# Patient Record
Sex: MALE | Race: WHITE | NOT HISPANIC OR LATINO | Employment: FULL TIME | URBAN - METROPOLITAN AREA
[De-identification: names, ages, dates, MRNs, and addresses within clinical notes are randomized per-mention and may not be internally consistent; named-entity substitution may affect disease eponyms.]

---

## 2017-11-24 ENCOUNTER — OFFICE VISIT (OUTPATIENT)
Dept: FAMILY MEDICINE | Facility: CLINIC | Age: 42
End: 2017-11-24
Payer: COMMERCIAL

## 2017-11-24 VITALS
DIASTOLIC BLOOD PRESSURE: 88 MMHG | HEIGHT: 74 IN | TEMPERATURE: 97 F | SYSTOLIC BLOOD PRESSURE: 132 MMHG | HEART RATE: 90 BPM | BODY MASS INDEX: 33.11 KG/M2 | WEIGHT: 258 LBS

## 2017-11-24 DIAGNOSIS — Z00.00 ROUTINE HISTORY AND PHYSICAL EXAMINATION OF ADULT: Primary | ICD-10-CM

## 2017-11-24 LAB
ALBUMIN SERPL-MCNC: 3.9 G/DL (ref 3.4–5)
ALP SERPL-CCNC: 57 U/L (ref 40–150)
ALT SERPL W P-5'-P-CCNC: 55 U/L (ref 0–70)
ANION GAP SERPL CALCULATED.3IONS-SCNC: 6 MMOL/L (ref 3–14)
AST SERPL W P-5'-P-CCNC: 36 U/L (ref 0–45)
BILIRUB SERPL-MCNC: 0.5 MG/DL (ref 0.2–1.3)
BUN SERPL-MCNC: 12 MG/DL (ref 7–30)
CALCIUM SERPL-MCNC: 8.6 MG/DL (ref 8.5–10.1)
CHLORIDE SERPL-SCNC: 104 MMOL/L (ref 94–109)
CHOLEST SERPL-MCNC: 143 MG/DL
CO2 SERPL-SCNC: 29 MMOL/L (ref 20–32)
CREAT SERPL-MCNC: 0.96 MG/DL (ref 0.66–1.25)
GFR SERPL CREATININE-BSD FRML MDRD: 86 ML/MIN/1.7M2
GLUCOSE SERPL-MCNC: 103 MG/DL (ref 70–99)
HDLC SERPL-MCNC: 58 MG/DL
LDLC SERPL CALC-MCNC: 63 MG/DL
NONHDLC SERPL-MCNC: 85 MG/DL
POTASSIUM SERPL-SCNC: 3.8 MMOL/L (ref 3.4–5.3)
PROT SERPL-MCNC: 7.3 G/DL (ref 6.8–8.8)
SODIUM SERPL-SCNC: 139 MMOL/L (ref 133–144)
TRIGL SERPL-MCNC: 111 MG/DL

## 2017-11-24 PROCEDURE — 80053 COMPREHEN METABOLIC PANEL: CPT | Performed by: PHYSICIAN ASSISTANT

## 2017-11-24 PROCEDURE — 99396 PREV VISIT EST AGE 40-64: CPT | Performed by: PHYSICIAN ASSISTANT

## 2017-11-24 PROCEDURE — 36415 COLL VENOUS BLD VENIPUNCTURE: CPT | Performed by: PHYSICIAN ASSISTANT

## 2017-11-24 PROCEDURE — 80061 LIPID PANEL: CPT | Performed by: PHYSICIAN ASSISTANT

## 2017-11-24 NOTE — NURSING NOTE
"Chief Complaint   Patient presents with     Physical       Initial BP (!) 132/98 (BP Location: Right arm, Patient Position: Chair, Cuff Size: Adult Large)  Pulse 90  Temp 97  F (36.1  C) (Tympanic)  Ht 6' 2\" (1.88 m)  Wt 258 lb (117 kg)  BMI 33.13 kg/m2 Estimated body mass index is 33.13 kg/(m^2) as calculated from the following:    Height as of this encounter: 6' 2\" (1.88 m).    Weight as of this encounter: 258 lb (117 kg).  Medication Reconciliation: complete    Health Maintenance that is potentially due pending provider review:  NONE    n/a    Is there anyone who you would like to be able to receive your results? No  If yes have patient fill out UNIQUE    Ellen SALAS CMA    "

## 2017-11-24 NOTE — LETTER
November 27, 2017      Chong Gomez  [id number] [facility] [address]        Dear ,    We are writing to inform you of your test results.    Labs are excellent!     Resulted Orders   Lipid panel reflex to direct LDL Fasting   Result Value Ref Range    Cholesterol 143 <200 mg/dL    Triglycerides 111 <150 mg/dL    HDL Cholesterol 58 >39 mg/dL    LDL Cholesterol Calculated 63 <100 mg/dL      Comment:      Desirable:       <100 mg/dl    Non HDL Cholesterol 85 <130 mg/dL   Comprehensive metabolic panel (BMP + Alb, Alk Phos, ALT, AST, Total. Bili, TP)   Result Value Ref Range    Sodium 139 133 - 144 mmol/L    Potassium 3.8 3.4 - 5.3 mmol/L    Chloride 104 94 - 109 mmol/L    Carbon Dioxide 29 20 - 32 mmol/L    Anion Gap 6 3 - 14 mmol/L    Glucose 103 (H) 70 - 99 mg/dL    Urea Nitrogen 12 7 - 30 mg/dL    Creatinine 0.96 0.66 - 1.25 mg/dL    GFR Estimate 86 >60 mL/min/1.7m2      Comment:      Non  GFR Calc    GFR Estimate If Black >90 >60 mL/min/1.7m2      Comment:       GFR Calc    Calcium 8.6 8.5 - 10.1 mg/dL    Bilirubin Total 0.5 0.2 - 1.3 mg/dL    Albumin 3.9 3.4 - 5.0 g/dL    Protein Total 7.3 6.8 - 8.8 g/dL    Alkaline Phosphatase 57 40 - 150 U/L    ALT 55 0 - 70 U/L    AST 36 0 - 45 U/L       If you have any questions or concerns, please call the clinic at the number listed above.       Sincerely,    Venkatesh Morelos PA-C/ ss

## 2017-11-24 NOTE — PROGRESS NOTES
SUBJECTIVE:   CC: Chong Gomez is an 41 year old male who presents for preventative health visit.     Healthy Habits:    Do you get at least three servings of calcium containing foods daily (dairy, green leafy vegetables, etc.)? yes    Amount of exercise or daily activities, outside of work: 1-2 day(s) per week    Problems taking medications regularly No    Medication side effects: No    Have you had an eye exam in the past two years? no    Do you see a dentist twice per year? yes    Do you have sleep apnea, excessive snoring or daytime drowsiness?no            Today's PHQ-2 Score:   PHQ-2 ( 1999 [employer]) 12/21/2013 12/16/2013   Q1: Little interest or pleasure in doing things 0 0   Q2: Feeling down, depressed or hopeless 0 0   PHQ-2 Score 0 0         Abuse: Current or Past(Physical, Sexual or Emotional)- No  Do you feel safe in your environment - Yes  Social History   Substance Use Topics     Smoking status: Former Smoker     Packs/day: 0.50     Years: 20.00     Types: Cigarettes     Quit date: 6/1/2013     Smokeless tobacco: Never Used     Alcohol use Yes      Comment: 12 pk a week      The patient does not drink >3 drinks per day nor >7 drinks per week.    Last PSA: No results found for: PSA    Reviewed orders with patient. Reviewed health maintenance and updated orders accordingly - Yes  BP Readings from Last 3 Encounters:   11/24/17 132/88   01/27/14 138/84   01/27/14 138/84    Wt Readings from Last 3 Encounters:   11/24/17 258 lb (117 kg)   01/27/14 243 lb (110.2 kg)   01/27/14 243 lb (110.2 kg)                  Patient Active Problem List   Diagnosis     CARDIOVASCULAR SCREENING; LDL GOAL LESS THAN 160     Chronic low back pain     Past Surgical History:   Procedure Laterality Date     SURGICAL HISTORY OF -   child     strabismus      SURGICAL HISTORY OF -   age 13     right wrist fracture        Social History   Substance Use Topics     Smoking status: Former Smoker     Packs/day: 0.50     Years: 20.00      "Types: Cigarettes     Quit date: 6/1/2013     Smokeless tobacco: Never Used     Alcohol use Yes      Comment: 12 pk a week      History reviewed. No pertinent family history.      No current outpatient prescriptions on file.     No Known Allergies        Reviewed and updated as needed this visit by clinical staffTobacco  Allergies  Meds  Med Hx  Surg Hx  Fam Hx  Soc Hx        Reviewed and updated as needed this visit by Provider        Past Medical History:   Diagnosis Date     NO ACTIVE PROBLEMS       Past Surgical History:   Procedure Laterality Date     SURGICAL HISTORY OF -   child     strabismus      SURGICAL HISTORY OF -   age 13     right wrist fracture        ROS:  C: NEGATIVE for fever, chills, change in weight  I: NEGATIVE for worrisome rashes, moles or lesions  E: NEGATIVE for vision changes or irritation  ENT: NEGATIVE for ear, mouth and throat problems  R: NEGATIVE for significant cough or SOB  CV: NEGATIVE for chest pain, palpitations or peripheral edema  GI: NEGATIVE for nausea, abdominal pain, heartburn, or change in bowel habits   male: negative for dysuria, hematuria, decreased urinary stream, erectile dysfunction, urethral discharge  M: NEGATIVE for significant arthralgias or myalgia  N: NEGATIVE for weakness, dizziness or paresthesias  P: NEGATIVE for changes in mood or affect    OBJECTIVE:   BP (!) 146/97 (BP Location: Right arm, Patient Position: Chair, Cuff Size: Adult Large)  Pulse 90  Temp 97  F (36.1  C) (Tympanic)  Ht 6' 2\" (1.88 m)  Wt 258 lb (117 kg)  BMI 33.13 kg/m2  EXAM:  GENERAL: healthy, alert and no distress  EYES: Eyes grossly normal to inspection, PERRL and conjunctivae and sclerae normal  NECK: no adenopathy, no asymmetry, masses, or scars and thyroid normal to palpation  RESP: lungs clear to auscultation - no rales, rhonchi or wheezes  CV: regular rate and rhythm, normal S1 S2, no S3 or S4, no murmur, click or rub, no peripheral edema and peripheral pulses " "strong  ABDOMEN: soft, nontender, no hepatosplenomegaly, no masses and bowel sounds normal  MS: no gross musculoskeletal defects noted, no edema  SKIN: no suspicious lesions or rashes  NEURO: Normal strength and tone, mentation intact and speech normal  PSYCH: mentation appears normal, affect normal/bright    ASSESSMENT/PLAN:   (Z00.00) Routine history and physical examination of adult  (primary encounter diagnosis)  Comment:   Plan: Lipid panel reflex to direct LDL Fasting,         Comprehensive metabolic panel (BMP + Alb, Alk         Phos, ALT, AST, Total. Bili, TP)            COUNSELING:  Reviewed preventive health counseling, as reflected in patient instructions       Regular exercise       Healthy diet/nutrition    BP Screening:   Last 3 BP Readings:    BP Readings from Last 3 Encounters:   11/24/17 132/88   01/27/14 138/84   01/27/14 138/84       The following was recommended to the patient:  Recommend lifestyle modifications       reports that he quit smoking about 4 years ago. His smoking use included Cigarettes. He has a 10.00 pack-year smoking history. He has never used smokeless tobacco.      Estimated body mass index is 33.13 kg/(m^2) as calculated from the following:    Height as of this encounter: 6' 2\" (1.88 m).    Weight as of this encounter: 258 lb (117 kg).   Weight management plan: Discussed healthy diet and exercise guidelines and patient will follow up in 12 months in clinic to re-evaluate.    Counseling Resources:  ATP IV Guidelines  Pooled Cohorts Equation Calculator  FRAX Risk Assessment  ICSI Preventive Guidelines  Dietary Guidelines for Americans, 2010  USDA's MyPlate  ASA Prophylaxis  Lung CA Screening    Mahamed AUGUSTS  [facility]      Venkatesh Morelos PA-C  [facility]  "

## 2017-11-24 NOTE — MR AVS SNAPSHOT
After Visit Summary   11/24/2017    Chong Gomez    MRN: [id number]           Patient Information     Date Of Birth          1975        Visit Information        Provider Department      11/24/2017 8:20 AM Venkatesh Morelos PA-C [facility]        Today's Diagnoses     Routine history and physical examination of adult    -  1      Care Instructions      Preventive Health Recommendations  Male Ages 40 to 49    Yearly exam:             See your health care provider every year in order to  o   Review health changes.   o   Discuss preventive care.    o   Review your medicines if your doctor has prescribed any.    You should be tested each year for STDs (sexually transmitted diseases) if you re at risk.     Have a cholesterol test every 5 years.     Have a colonoscopy (test for colon cancer) if someone in your family has had colon cancer or polyps before age 50.     After age 45, have a diabetes test (fasting glucose). If you are at risk for diabetes, you should have this test every 3 years.      Talk with your health care provider about whether or not a prostate cancer screening test (PSA) is right for you.    Shots: Get a flu shot each year. Get a tetanus shot every 10 years.     Nutrition:    Eat at least 5 servings of fruits and vegetables daily.     Eat whole-grain bread, whole-wheat pasta and brown rice instead of white grains and rice.     Talk to your provider about Calcium and Vitamin D.     Lifestyle    Exercise for at least 150 minutes a week (30 minutes a day, 5 days a week). This will help you control your weight and prevent disease.     Limit alcohol to one drink per day.     No smoking.     Wear sunscreen to prevent skin cancer.     See your dentist every six months for an exam and cleaning.              Follow-ups after your visit        Follow-up notes from your care team     Return if symptoms worsen or fail to improve.      Who to contact     If you have questions  "or need follow up information about today's clinic visit or your schedule please contact [facility] directly at [phone].  Normal or non-critical lab and imaging results will be communicated to you by Giveohart, letter or phone within 4 business days after the clinic has received the results. If you do not hear from us within 7 days, please contact the clinic through Giveohart or phone. If you have a critical or abnormal lab result, we will notify you by phone as soon as possible.  Submit refill requests through Devunity or call your pharmacy and they will forward the refill request to us. Please allow 3 business days for your refill to be completed.          Additional Information About Your Visit        GiveoharPerosphere Information     Devunity lets you send messages to your doctor, view your test results, renew your prescriptions, schedule appointments and more. To sign up, go to [email or web address] . Click on \"Log in\" on the left side of the screen, which will take you to the Welcome page. Then click on \"Sign up Now\" on the right side of the page.     You will be asked to enter the access code listed below, as well as some personal information. Please follow the directions to create your username and password.     Your access code is: [email or web address]  Expires: 2018 10:44 AM     Your access code will  in 90 days. If you need help or a new code, please call your [facility] clinic or [phone].        Care EveryWhere ID     This is your Care EveryWhere ID. This could be used by other organizations to access your [facility] medical records  [id number]        Your Vitals Were     Pulse Temperature Height BMI (Body Mass Index)          90 97  F (36.1  C) (Tympanic) 6' 2\" (1.88 m) 33.13 kg/m2         Blood Pressure from Last 3 Encounters:   17 132/88   14 138/84   14 138/84    Weight from Last 3 Encounters:   17 258 lb (117 kg)   14 243 lb (110.2 kg)   14 243 lb " (110.2 kg)              We Performed the Following     Comprehensive metabolic panel (BMP + Alb, Alk Phos, ALT, AST, Total. Bili, TP)     Lipid panel reflex to direct LDL Fasting        Primary Care Provider Office Phone # Fax #    Venkatesh Morelos PA-C [phone] [address] [facility] [address]        Equal Access to Services     [facility] CenterJAVIER : Hadii aad ku hadasho Soomaali, waaxda luqadaha, qaybta kaalmada adeegyada, waxay idiin hayaan adeeg kharash la'aan ah. So [facility] [phone].    ATENCIÓN: Si habla español, tiene a chavis disposición servicios gratuitos de asistencia lingüística. Llame al [phone].    We comply with applicable federal civil rights laws and [place] laws. We do not discriminate on the basis of race, color, national origin, age, disability, sex, sexual orientation, or gender identity.            Thank you!     Thank you for choosing [facility]  for your care. Our goal is always to provide you with excellent care. Hearing back from our patients is one way we can continue to improve our services. Please take a few minutes to complete the written survey that you may receive in the mail after your visit with us. Thank you!             Your Updated Medication List - Protect others around you: Learn how to safely use, store and throw away your medicines at www.disposemymeds.org.      Notice  As of 11/24/2017 10:44 AM    You have not been prescribed any medications.

## 2018-04-13 ENCOUNTER — TELEPHONE (OUTPATIENT)
Dept: FAMILY MEDICINE | Facility: CLINIC | Age: 43
End: 2018-04-13

## 2018-04-13 DIAGNOSIS — Z80.0 FAMILY HISTORY OF PANCREATIC CANCER: Primary | ICD-10-CM

## 2018-04-13 NOTE — TELEPHONE ENCOUNTER
Reason for Call: Request for an order or referral:    Order or referral being requested: Chong is wondering if he could get a referral to Genetic Counseling. He says his dad recently passed from pancreatic cancer so he would like to get genetic testing. Zack Morelos is the last provider he saw. He is aware that Zack is out of the office until 4/18 and this is fine. He is aware that we will call him next week when Zack returns to the office.     Date needed: at your convenience    Has the patient been seen by the PCP for this problem? NO    Additional comments: States his dad was diagnosed and he passed away after a short illness    Phone number Patient can be reached at:  Home number on file [phone] (home)    Best Time:  anytime    Can we leave a detailed message on this number?  YES    Call taken on 4/13/2018 at 2:43 PM by Zyara Martin

## 2018-04-18 NOTE — TELEPHONE ENCOUNTER
He would like to be referred to Dr Ashley Duffy at the [facility] at [facility].  Phone: [phone]  Fax: [phone]  Certified Genetic Counselor

## 2018-04-18 NOTE — TELEPHONE ENCOUNTER
I do not think genetic counseling will change anything.  I would recommend yearly visits and focus on lab work targeting the pancreas    I am not aware of genetic counseling recommendations for pancreatic cancer.  If there are such recommendations, I am fine with such referral

## 2018-05-06 ENCOUNTER — OFFICE VISIT (OUTPATIENT)
Dept: URGENT CARE | Facility: URGENT CARE | Age: 43
End: 2018-05-06
Payer: COMMERCIAL

## 2018-05-06 ENCOUNTER — RADIANT APPOINTMENT (OUTPATIENT)
Dept: GENERAL RADIOLOGY | Facility: CLINIC | Age: 43
End: 2018-05-06
Attending: NURSE PRACTITIONER
Payer: COMMERCIAL

## 2018-05-06 VITALS
RESPIRATION RATE: 18 BRPM | DIASTOLIC BLOOD PRESSURE: 86 MMHG | SYSTOLIC BLOOD PRESSURE: 144 MMHG | TEMPERATURE: 97.6 F | HEART RATE: 90 BPM | BODY MASS INDEX: 33.51 KG/M2 | OXYGEN SATURATION: 95 % | WEIGHT: 261 LBS

## 2018-05-06 DIAGNOSIS — S59.901A INJURY OF RIGHT ELBOW, INITIAL ENCOUNTER: ICD-10-CM

## 2018-05-06 DIAGNOSIS — S56.911A ELBOW STRAIN, RIGHT, INITIAL ENCOUNTER: Primary | ICD-10-CM

## 2018-05-06 PROCEDURE — 73080 X-RAY EXAM OF ELBOW: CPT | Mod: RT

## 2018-05-06 PROCEDURE — 99213 OFFICE O/P EST LOW 20 MIN: CPT | Performed by: NURSE PRACTITIONER

## 2018-05-06 NOTE — PROGRESS NOTES
SUBJECTIVE:   Chong Gomez is a 42 year old male who presents to clinic today for the following health issues:  Chief Complaint   Patient presents with     Musculoskeletal Problem     playing with dog yesterday and may have hyper extended arm- went to playfully grab dog and hurt his arm, pain is mostly in the elbow, some swelling, no bruising                  Problem list and histories reviewed & adjusted, as indicated.  Additional history: as documented    Patient Active Problem List   Diagnosis     CARDIOVASCULAR SCREENING; LDL GOAL LESS THAN 160     Chronic low back pain     Past Surgical History:   Procedure Laterality Date     SURGICAL HISTORY OF -   child     strabismus      SURGICAL HISTORY OF -   age 13     right wrist fracture        Social History   Substance Use Topics     Smoking status: Former Smoker     Packs/day: 0.50     Years: 20.00     Types: Cigarettes     Quit date: 6/1/2013     Smokeless tobacco: Never Used     Alcohol use Yes      Comment: 12 pk a week      History reviewed. No pertinent family history.      No current outpatient prescriptions on file.     No Known Allergies  Labs reviewed in EPIC    Reviewed and updated as needed this visit by clinical staff  Tobacco  Allergies  Meds  Problems  Med Hx  Surg Hx  Fam Hx  Soc Hx        Reviewed and updated as needed this visit by Provider  Allergies  Meds  Problems         ROS:  Constitutional, HEENT, cardiovascular, pulmonary, GI, , musculoskeletal, neuro, skin, endocrine and psych systems are negative, except as otherwise noted.    OBJECTIVE:     /86  Pulse 90  Temp 97.6  F (36.4  C) (Tympanic)  Resp 18  Wt 261 lb (118.4 kg)  SpO2 95%  BMI 33.51 kg/m2  Body mass index is 33.51 kg/(m^2).   GENERAL: healthy, alert and no distress, nontoxic in appearance  EYES: Eyes grossly normal to inspection, PERRL and conjunctivae and sclerae normal  HENT: normocephalic  NECK: supple with full ROM  ABDOMEN: soft, nontender, no  hepatosplenomegaly, no masses and bowel sounds normal  MS: no gross musculoskeletal defects noted, no edema, right arm is tender just below elbow with movement of supination/pronation.    Diagnostic Test Results:ELBOW THREE VIEWS RIGHT  5/6/2018 4:58 PM      HISTORY: ; Injury of right elbow, initial encounter     COMPARISON: None.     FINDINGS: There is no significant degenerative change. No posterior  fat-pad is seen. No convincing anterior sail sign is seen. There is no  acute fracture or dislocation. There are no worrisome bony lesions.         IMPRESSION: No acute fracture or dislocation demonstrated.     KHRIS RADFORD MD  Results for orders placed or performed in visit on 05/06/18 (from the past 24 hour(s))   XR Elbow Right G/E 3 Views    Narrative    ELBOW THREE VIEWS RIGHT  5/6/2018 4:58 PM     HISTORY: ; Injury of right elbow, initial encounter    COMPARISON: None.    FINDINGS: There is no significant degenerative change. No posterior  fat-pad is seen. No convincing anterior sail sign is seen. There is no  acute fracture or dislocation. There are no worrisome bony lesions.      Impression    IMPRESSION: No acute fracture or dislocation demonstrated.    KHRIS RADFORD MD       ASSESSMENT/PLAN:   Follow up as needed with PCP if not getting better this next week and sooner of course if needed.  Problem List Items Addressed This Visit     None      Visit Diagnoses     Elbow strain, right, initial encounter    -  Primary    Injury of right elbow, initial encounter        Relevant Orders    XR Elbow Right G/E 3 Views (Completed)               Patient Instructions   RICE advice    Sling for comfort    Ibuprofen and/or tylenol as needed    Follow up with your primary care provider if symptoms worsen or do not resolve.    Follow-up with your primary care provider next week and as needed.    Indications for emergent return to emergency department discussed with patient, who verbalized good understanding and  agreement.  Patient understands the limitations of today's evaluation.         Muscle Strain in the Extremities  A muscle strain is a stretching and tearing of muscle fibers. This causes pain, especially when you move that muscle. There may also be some swelling and bruising.  Home care    Keep the hurt area raised to reduce pain and swelling. This is especially important during the first 48 hours.    Apply an ice pack over the injured area for 15 to 20 minutes every 3 to 6 hours. You should do this for the first 24 to 48 hours. You can make an ice pack by filling a plastic bag that seals at the top with ice cubes and then wrapping it with a thin towel. Be careful not to injure your skin with the ice treatments. Ice should never be applied directly to skin. Continue the use of ice packs for relief of pain and swelling as needed. After 48 hours, apply heat (warm shower or warm bath) for 15 to 20 minutes several times a day, or alternate ice and heat.    You may use over-the-counter pain medicine to control pain, unless another medicine was prescribed. If you have chronic liver or kidney disease or ever had a stomach ulcer or GI bleeding, talk with your healthcare provider before using these medicines.    For leg strains: If crutches have been recommended, don t put full weight on the hurt leg until you can do so without pain. You can return to sports when you are able to hop and run on the injured leg without pain.  Follow-up care  Follow up with your healthcare provider, or as advised.  When to seek medical advice  Call your healthcare provider right away if any of these occur:    The toes of the injured leg become swollen, cold, blue, numb, or tingly    Pain or swelling increases  Date Last Reviewed: 11/19/2015 2000-2017 [employer] [address] [place] [address], [place] [address]. All rights reserved. This information is not intended as a substitute for professional medical care. Always follow your  healthcare professional's instructions.        Elbow Sprain    A sprain is a tearing of the ligaments that hold a joint together. This may take up to 6 weeks to fully heal, depending on how severe it is. Moderate to severe sprains are treated with a sling or splint. Minor sprains can be treated without any special support.  Home care  The following guidelines will help you care for your injury at home:    Keep your arm elevated to reduce pain and swelling. When sitting or lying down keep your arm above the level of your heart. You can do this by placing your arm on a pillow that rests on your chest or on a pillow at your side. This is most important during the first 2 days (48 hours) after injury.    Put an ice pack on the injured area. Do this for 20 minutes every 1 to 2 hours the first day. You can make an ice pack by wrapping a plastic bag of ice cubes in a thin towel. As the ice melts, be careful that the splint doesn t get wet. Continue using the ice pack 3 to 4 times a day for the next 2 days. Then use the ice pack as needed to ease pain and swelling.    If you were given a plaster or fiberglass splint, leave it on as advised, or until you see your healthcare provider. Keep it dry at all times. Bathe with your splint out of the water. Protect it with a large plastic bag, rubber-banded at the top end. If a fiberglass splint gets wet, you can dry it with a hair dryer. Once the splint is removed, move your elbow through its full range of motion several times a day. This will prevent stiffness.    If you were given a sling only, begin gradual range-of-motion exercises after the first few days, unless told otherwise. This will prevent stiffness in the elbow. Stop wearing the sling once the pain is better.    You may use acetaminophen or ibuprofen to control pain, unless another pain medicine was prescribed. If you have chronic liver or kidney disease, talk with your healthcare provider before using these medicines.  Also talk with your provider if you ve had a stomach ulcer or gastrointestinal bleeding.  Follow-up care  Follow up with your doctor as directed.  Any X-rays you had today don t show any broken bones, breaks, or fractures. Sometimes fractures don t show up on the first X-ray. Bruises and sprains can sometimes hurt as much as a fracture. These injuries can take time to heal completely. If your symptoms don t improve or they get worse, talk with your healthcare provider. You may need a repeat X-ray or other tests.  When to seek medical advice  Call your healthcare provider right away  if any of these occur:    The plaster splint becomes wet or soft    The fiberglass splint remains wet for more than 24 hours    Bad odor from the splint or wound fluid stains the splint    Splint cracks    Tightness or pain in the elbow gets worse    Fingers become swollen, cold, blue, numb, or tingly    You are less able to move the elbow, hand or fingers    Area around splint becomes red, swollen, or irritated    Fever of 101 F (38.3 C) or higher, or as directed by your healthcare provider  Date Last Reviewed: 5/1/2017 2000-2017 [employer]. [address] [place] [address]. All rights reserved. This information is not intended as a substitute for professional medical care. Always follow your healthcare professional's instructions.            ANIL Nava [facility]

## 2018-05-06 NOTE — MR AVS SNAPSHOT
After Visit Summary   5/6/2018    Chong Gomez    MRN: [id number]           Patient Information     Date Of Birth          1975        Visit Information        Provider Department      5/6/2018 4:05 PM Maryuri Lindsay APRN [facility]        Today's Diagnoses     Elbow strain, right, initial encounter    -  1    Injury of right elbow, initial encounter          Care Instructions    RICE advice    Sling for comfort    Ibuprofen and/or tylenol as needed    Follow up with your primary care provider if symptoms worsen or do not resolve.    Follow-up with your primary care provider next week and as needed.    Indications for emergent return to emergency department discussed with patient, who verbalized good understanding and agreement.  Patient understands the limitations of today's evaluation.         Muscle Strain in the Extremities  A muscle strain is a stretching and tearing of muscle fibers. This causes pain, especially when you move that muscle. There may also be some swelling and bruising.  Home care    Keep the hurt area raised to reduce pain and swelling. This is especially important during the first 48 hours.    Apply an ice pack over the injured area for 15 to 20 minutes every 3 to 6 hours. You should do this for the first 24 to 48 hours. You can make an ice pack by filling a plastic bag that seals at the top with ice cubes and then wrapping it with a thin towel. Be careful not to injure your skin with the ice treatments. Ice should never be applied directly to skin. Continue the use of ice packs for relief of pain and swelling as needed. After 48 hours, apply heat (warm shower or warm bath) for 15 to 20 minutes several times a day, or alternate ice and heat.    You may use over-the-counter pain medicine to control pain, unless another medicine was prescribed. If you have chronic liver or kidney disease or ever had a stomach ulcer or GI bleeding,  talk with your healthcare provider before using these medicines.    For leg strains: If crutches have been recommended, don t put full weight on the hurt leg until you can do so without pain. You can return to sports when you are able to hop and run on the injured leg without pain.  Follow-up care  Follow up with your healthcare provider, or as advised.  When to seek medical advice  Call your healthcare provider right away if any of these occur:    The toes of the injured leg become swollen, cold, blue, numb, or tingly    Pain or swelling increases  Date Last Reviewed: 11/19/2015 2000-2017 [employer]. [address] [place] [address]. All rights reserved. This information is not intended as a substitute for professional medical care. Always follow your healthcare professional's instructions.        Elbow Sprain    A sprain is a tearing of the ligaments that hold a joint together. This may take up to 6 weeks to fully heal, depending on how severe it is. Moderate to severe sprains are treated with a sling or splint. Minor sprains can be treated without any special support.  Home care  The following guidelines will help you care for your injury at home:    Keep your arm elevated to reduce pain and swelling. When sitting or lying down keep your arm above the level of your heart. You can do this by placing your arm on a pillow that rests on your chest or on a pillow at your side. This is most important during the first 2 days (48 hours) after injury.    Put an ice pack on the injured area. Do this for 20 minutes every 1 to 2 hours the first day. You can make an ice pack by wrapping a plastic bag of ice cubes in a thin towel. As the ice melts, be careful that the splint doesn t get wet. Continue using the ice pack 3 to 4 times a day for the next 2 days. Then use the ice pack as needed to ease pain and swelling.    If you were given a plaster or fiberglass splint, leave it on as advised, or until  you see your healthcare provider. Keep it dry at all times. Bathe with your splint out of the water. Protect it with a large plastic bag, rubber-banded at the top end. If a fiberglass splint gets wet, you can dry it with a hair dryer. Once the splint is removed, move your elbow through its full range of motion several times a day. This will prevent stiffness.    If you were given a sling only, begin gradual range-of-motion exercises after the first few days, unless told otherwise. This will prevent stiffness in the elbow. Stop wearing the sling once the pain is better.    You may use acetaminophen or ibuprofen to control pain, unless another pain medicine was prescribed. If you have chronic liver or kidney disease, talk with your healthcare provider before using these medicines. Also talk with your provider if you ve had a stomach ulcer or gastrointestinal bleeding.  Follow-up care  Follow up with your doctor as directed.  Any X-rays you had today don t show any broken bones, breaks, or fractures. Sometimes fractures don t show up on the first X-ray. Bruises and sprains can sometimes hurt as much as a fracture. These injuries can take time to heal completely. If your symptoms don t improve or they get worse, talk with your healthcare provider. You may need a repeat X-ray or other tests.  When to seek medical advice  Call your healthcare provider right away  if any of these occur:    The plaster splint becomes wet or soft    The fiberglass splint remains wet for more than 24 hours    Bad odor from the splint or wound fluid stains the splint    Splint cracks    Tightness or pain in the elbow gets worse    Fingers become swollen, cold, blue, numb, or tingly    You are less able to move the elbow, hand or fingers    Area around splint becomes red, swollen, or irritated    Fever of 101 F (38.3 C) or higher, or as directed by your healthcare provider  Date Last Reviewed: 5/1/2017 2000-2017 [employer]  "[address] [place] [address]. All rights reserved. This information is not intended as a substitute for professional medical care. Always follow your healthcare professional's instructions.                Follow-ups after your visit        Follow-up notes from your care team     See patient instructions section of the AVS Return if symptoms worsen or fail to improve, for Follow up with your primary care provider.      Who to contact     If you have questions or need follow up information about today's clinic visit or your schedule please contact [facility] directly at [phone].  Normal or non-critical lab and imaging results will be communicated to you by [employer], letter or phone within 4 business days after the clinic has received the results. If you do not hear from us within 7 days, please contact the clinic through [employer] or phone. If you have a critical or abnormal lab result, we will notify you by phone as soon as possible.  Submit refill requests through [employer] or call your pharmacy and they will forward the refill request to us. Please allow 3 business days for your refill to be completed.          Additional Information About Your Visit        MyChart Information     [employer] lets you send messages to your doctor, view your test results, renew your prescriptions, schedule appointments and more. To sign up, go to [email or web address] . Click on \"Log in\" on the left side of the screen, which will take you to the Welcome page. Then click on \"Sign up Now\" on the right side of the page.     You will be asked to enter the access code listed below, as well as some personal information. Please follow the directions to create your username and password.     Your access code is: [email or web address]  Expires: 2018  5:19 PM     Your access code will  in 90 days. If you need help or a new code, please call your [facility] or [phone].        Care " EveryWhere ID     This is your Care EveryWhere ID. This could be used by other organizations to access your [place] medical records  [id number]        Your Vitals Were     Pulse Temperature Respirations Pulse Oximetry BMI (Body Mass Index)       90 97.6  F (36.4  C) (Tympanic) 18 95% 33.51 kg/m2        Blood Pressure from Last 3 Encounters:   05/06/18 144/86   11/24/17 132/88   01/27/14 138/84    Weight from Last 3 Encounters:   05/06/18 261 lb (118.4 kg)   11/24/17 258 lb (117 kg)   01/27/14 243 lb (110.2 kg)              We Performed the Following     XR Elbow Right G/E 3 Views        Primary Care Provider Office Phone # Fax #    Venkatesh Morelos PA-C [phone] [address] [facility] [address]        Equal Access to Services     DAVID [facility] HospitalJAV[facility] : Hadii aad ku hadasho Soerin, waaxda luqadaha, qaybta kaalmada adezionyada, ina lance . So [facility] [phone].    ATENCIÓN: Si habla español, tiene a chavis disposición servicios gratuitos de asistencia lingüística. Llame al [phone].    We comply with applicable federal civil rights laws and [place] laws. We do not discriminate on the basis of race, color, national origin, age, disability, sex, sexual orientation, or gender identity.            Thank you!     Thank you for choosing [facility]  for your care. Our goal is always to provide you with excellent care. Hearing back from our patients is one way we can continue to improve our services. Please take a few minutes to complete the written survey that you may receive in the mail after your visit with us. Thank you!             Your Updated Medication List - Protect others around you: Learn how to safely use, store and throw away your medicines at www.disposemymeds.org.      Notice  As of 5/6/2018  5:19 PM    You have not been prescribed any medications.

## 2018-05-06 NOTE — PATIENT INSTRUCTIONS
RICE advice    Sling for comfort    Ibuprofen and/or tylenol as needed    Follow up with your primary care provider if symptoms worsen or do not resolve.    Follow-up with your primary care provider next week and as needed.    Indications for emergent return to emergency department discussed with patient, who verbalized good understanding and agreement.  Patient understands the limitations of today's evaluation.         Muscle Strain in the Extremities  A muscle strain is a stretching and tearing of muscle fibers. This causes pain, especially when you move that muscle. There may also be some swelling and bruising.  Home care    Keep the hurt area raised to reduce pain and swelling. This is especially important during the first 48 hours.    Apply an ice pack over the injured area for 15 to 20 minutes every 3 to 6 hours. You should do this for the first 24 to 48 hours. You can make an ice pack by filling a plastic bag that seals at the top with ice cubes and then wrapping it with a thin towel. Be careful not to injure your skin with the ice treatments. Ice should never be applied directly to skin. Continue the use of ice packs for relief of pain and swelling as needed. After 48 hours, apply heat (warm shower or warm bath) for 15 to 20 minutes several times a day, or alternate ice and heat.    You may use over-the-counter pain medicine to control pain, unless another medicine was prescribed. If you have chronic liver or kidney disease or ever had a stomach ulcer or GI bleeding, talk with your healthcare provider before using these medicines.    For leg strains: If crutches have been recommended, don t put full weight on the hurt leg until you can do so without pain. You can return to sports when you are able to hop and run on the injured leg without pain.  Follow-up care  Follow up with your healthcare provider, or as advised.  When to seek medical advice  Call your healthcare provider right away if any of these  occur:    The toes of the injured leg become swollen, cold, blue, numb, or tingly    Pain or swelling increases  Date Last Reviewed: 11/19/2015 2000-2017 [employer]. [address] [place] [address], [place] [address]. All rights reserved. This information is not intended as a substitute for professional medical care. Always follow your healthcare professional's instructions.        Elbow Sprain    A sprain is a tearing of the ligaments that hold a joint together. This may take up to 6 weeks to fully heal, depending on how severe it is. Moderate to severe sprains are treated with a sling or splint. Minor sprains can be treated without any special support.  Home care  The following guidelines will help you care for your injury at home:    Keep your arm elevated to reduce pain and swelling. When sitting or lying down keep your arm above the level of your heart. You can do this by placing your arm on a pillow that rests on your chest or on a pillow at your side. This is most important during the first 2 days (48 hours) after injury.    Put an ice pack on the injured area. Do this for 20 minutes every 1 to 2 hours the first day. You can make an ice pack by wrapping a plastic bag of ice cubes in a thin towel. As the ice melts, be careful that the splint doesn t get wet. Continue using the ice pack 3 to 4 times a day for the next 2 days. Then use the ice pack as needed to ease pain and swelling.    If you were given a plaster or fiberglass splint, leave it on as advised, or until you see your healthcare provider. Keep it dry at all times. Bathe with your splint out of the water. Protect it with a large plastic bag, rubber-banded at the top end. If a fiberglass splint gets wet, you can dry it with a hair dryer. Once the splint is removed, move your elbow through its full range of motion several times a day. This will prevent stiffness.    If you were given a sling only, begin gradual range-of-motion exercises after  the first few days, unless told otherwise. This will prevent stiffness in the elbow. Stop wearing the sling once the pain is better.    You may use acetaminophen or ibuprofen to control pain, unless another pain medicine was prescribed. If you have chronic liver or kidney disease, talk with your healthcare provider before using these medicines. Also talk with your provider if you ve had a stomach ulcer or gastrointestinal bleeding.  Follow-up care  Follow up with your doctor as directed.  Any X-rays you had today don t show any broken bones, breaks, or fractures. Sometimes fractures don t show up on the first X-ray. Bruises and sprains can sometimes hurt as much as a fracture. These injuries can take time to heal completely. If your symptoms don t improve or they get worse, talk with your healthcare provider. You may need a repeat X-ray or other tests.  When to seek medical advice  Call your healthcare provider right away  if any of these occur:    The plaster splint becomes wet or soft    The fiberglass splint remains wet for more than 24 hours    Bad odor from the splint or wound fluid stains the splint    Splint cracks    Tightness or pain in the elbow gets worse    Fingers become swollen, cold, blue, numb, or tingly    You are less able to move the elbow, hand or fingers    Area around splint becomes red, swollen, or irritated    Fever of 101 F (38.3 C) or higher, or as directed by your healthcare provider  Date Last Reviewed: 5/1/2017 2000-2017 [employer]. [address] [place] [address], [place] [address]. All rights reserved. This information is not intended as a substitute for professional medical care. Always follow your healthcare professional's instructions.

## 2018-09-11 DIAGNOSIS — Z31.41 FERTILITY TESTING: Primary | ICD-10-CM

## 2019-05-30 ENCOUNTER — TELEPHONE (OUTPATIENT)
Dept: FAMILY MEDICINE | Facility: CLINIC | Age: 44
End: 2019-05-30

## 2019-05-30 NOTE — TELEPHONE ENCOUNTER
Pt states he partially pulled up thumb nail last night changing a tire. States he cleaned it well and applied neosporin and covered it with a dressing. Offered an appointment but pt declined. Advised to monitor for s/s of infection. Advised to let the nail fall of naturally and not pull or cut it off. Pt's tetanus is up to date. Iveth Uriostegui RN

## 2019-05-30 NOTE — TELEPHONE ENCOUNTER
Smashed thumb last night putting wheel on car and it took off part of the nail off.  It is weeping and he has wrapped it with gauze.  Wondering if he should be seen?    Nellie Toure  [facility]

## 2019-05-31 ENCOUNTER — TELEPHONE (OUTPATIENT)
Dept: FAMILY MEDICINE | Facility: CLINIC | Age: 44
End: 2019-05-31

## 2019-05-31 NOTE — TELEPHONE ENCOUNTER
Reason for call:  Patient reporting a symptom    Symptom or request: smashed thumb 5/29 Wed. Spoke with Nurse 5/30. Pt said that it continues to bleed. Wondering if this is normal. Please Advise.    Duration (how long have symptoms been present):  Wed.    Have you been treated for this before? No        Phone Number patient can be reached at:  Home number on file [phone] (home)    Best Time:  Any Time      Can we leave a detailed message on this number:  YES    Call taken on 5/31/2019 at 2:03 PM by Laura Lopez

## 2019-05-31 NOTE — TELEPHONE ENCOUNTER
Right thumb got smashed on Wednesday, using neosporin and gauze, nail is there but still hanging on the edge, not red or painful, not swollen, the bleeding is better. Patient is wondering if ok if it is bleeding.  Advised to monitor the thumb, if develops worsening symptoms or bleeding worsens or drainage is foul or green, develops fever, then needs to be seen, otherwise continue to treat it with neosporin and gauze. Patient agrees to monitor.    ABELINO Fernandez

## 2019-07-08 DIAGNOSIS — Z31.41 FERTILITY TESTING: Primary | ICD-10-CM

## 2019-07-11 DIAGNOSIS — Z31.41 FERTILITY TESTING: ICD-10-CM

## 2019-07-11 LAB
ABNORMAL SPERM: 99 MORPHOLOGY
ABSTINENCE DAYS: 4 DAYS (ref 2–7)
AGGLUTINATION: NO YES/NO
ANALYSIS TEMP - CENTIGRADE: 24 CENTIGRADE
CELL FRAGMENTS: ABNORMAL %
COLLECTION METHOD: ABNORMAL
COLLECTION SITE: ABNORMAL
CONSENT TO RELEASE TO PARTNER: YES
HEAD DEFECT: 98
IMMATURE SPERM: ABNORMAL %
IMMOTILE: 30 %
LAB RECEIPT TIME: ABNORMAL
LIQUEFIED: YES YES/NO
MIDPIECE DEFECT: 46
NON-PROGRESSIVE MOTILITY: 3 %
NORMAL SPERM: 1 % NORMAL FORMS (ref 4–?)
PROGRESSIVE MOTILITY: 67 % (ref 32–?)
ROUND CELLS: 0.2 MILLION/ML (ref ?–2)
SPECIMEN CONCENTRATION: 113 MILLION/ML (ref 15–?)
SPECIMEN PH: 7.6 PH (ref 7.2–?)
SPECIMEN TYPE: ABNORMAL
SPECIMEN VOL UR: 3.2 ML (ref 1.5–?)
TAIL DEFECT: 12
TIME OF ANALYSIS: ABNORMAL
TOTAL NUMBER: 362 MILLION (ref 39–?)
TOTAL PROGRESSIVE MOTILE: 243 MILLION (ref 15.6–?)
VISCOUS: NO YES/NO
VITALITY: ABNORMAL % (ref 58–?)
WBC SPECIMEN: ABNORMAL %

## 2019-07-11 PROCEDURE — 89322 SEMEN ANAL STRICT CRITERIA: CPT

## 2019-07-12 DIAGNOSIS — N46.9 MALE INFERTILITY: Primary | ICD-10-CM

## 2019-07-12 NOTE — RESULT ENCOUNTER NOTE
Please call the patient with the results.  The number of sperm were good, but the percentage of normal looking sperm was less than normal, so this could be contributing to the couple's infertility.  Please also send a letter with results so he can share with his partner (we do not have his partner's name on file so we cannot align it with her chart unless they give us that information).  Please also order a referral for Dr Mark Cordero at the  for a urology consult to see if he has any recommendations for improving the morphology of the sperm.     Thank you.    Claudette Crane MD

## 2019-08-12 NOTE — TELEPHONE ENCOUNTER
MEDICAL RECORDS REQUEST   [facility]  Urology Clinic  [address] [place] [address]  PHONE: [phone]  Fax: [phone]        FUTURE VISIT INFORMATION                                                   Mark Gomez, : 1975 scheduled for future visit at [facility]    APPOINTMENT INFORMATION:    Date: 10/03/19 7:20AM     Provider:  Mark Cordero MD     Reason for Visit/Diagnosis: Infertility     REFERRAL INFORMATION:    Referring provider:  N/A    Specialty: N/A    Referring providers clinic: [facility]     Clinic contact number:  N/A     RECORDS REQUESTED FOR VISIT                                                     NOTES  STATUS/DETAILS   OFFICE NOTE from referring provider  yes   OFFICE NOTE from other specialist  no   DISCHARGE SUMMARY from hospital  no   DISCHARGE REPORT from the ER  no   OPERATIVE REPORT  no   MEDICATION LIST  no   INFERTILITY     ALBUMIN  no   FSH  no   LAST UROLOGY/OB GYN VISIT NOTE  yes   LH  no   SEMEN ANALYSIS (LAST 2)  yes   SHBG  no   T  no     PRE-VISIT CHECKLIST      Record collection complete Yes - All recs in epic    Appointment appropriately scheduled           (right time/right provider) Yes   MyChart activation If no, please explain: In process    Questionnaire complete If no, please explain: In process      Completed by: Lilia Henson

## 2019-09-24 ENCOUNTER — PRE VISIT (OUTPATIENT)
Dept: UROLOGY | Facility: CLINIC | Age: 44
End: 2019-09-24

## 2019-09-24 NOTE — TELEPHONE ENCOUNTER
Patient coming in for fertility consult with Dr. Cordero. SA in system. Patient chart reviewed, no need for call, all records available and ready for appointment.

## 2019-10-03 ENCOUNTER — OFFICE VISIT (OUTPATIENT)
Dept: UROLOGY | Facility: CLINIC | Age: 44
End: 2019-10-03
Payer: COMMERCIAL

## 2019-10-03 ENCOUNTER — PRE VISIT (OUTPATIENT)
Dept: UROLOGY | Facility: CLINIC | Age: 44
End: 2019-10-03

## 2019-10-03 ENCOUNTER — APPOINTMENT (OUTPATIENT)
Dept: LAB | Facility: CLINIC | Age: 44
End: 2019-10-03
Payer: COMMERCIAL

## 2019-10-03 VITALS
WEIGHT: 250 LBS | HEART RATE: 97 BPM | SYSTOLIC BLOOD PRESSURE: 143 MMHG | HEIGHT: 75 IN | BODY MASS INDEX: 31.08 KG/M2 | DIASTOLIC BLOOD PRESSURE: 93 MMHG

## 2019-10-03 DIAGNOSIS — R86.8 TERATOSPERMIA: ICD-10-CM

## 2019-10-03 DIAGNOSIS — Z31.41 FERTILITY TESTING: Primary | ICD-10-CM

## 2019-10-03 LAB — FSH SERPL-ACNC: 3.2 IU/L (ref 0.7–10.8)

## 2019-10-03 ASSESSMENT — PATIENT HEALTH QUESTIONNAIRE - PHQ9
SUM OF ALL RESPONSES TO PHQ QUESTIONS 1-9: 0
SUM OF ALL RESPONSES TO PHQ QUESTIONS 1-9: 0
10. IF YOU CHECKED OFF ANY PROBLEMS, HOW DIFFICULT HAVE THESE PROBLEMS MADE IT FOR YOU TO DO YOUR WORK, TAKE CARE OF THINGS AT HOME, OR GET ALONG WITH OTHER PEOPLE: NOT DIFFICULT AT ALL

## 2019-10-03 ASSESSMENT — ENCOUNTER SYMPTOMS
SHORTNESS OF BREATH: 0
COUGH DISTURBING SLEEP: 0
WHEEZING: 0
SPUTUM PRODUCTION: 0
SNORES LOUDLY: 1
COUGH: 0
DYSPNEA ON EXERTION: 0
HEMOPTYSIS: 0
POSTURAL DYSPNEA: 0

## 2019-10-03 ASSESSMENT — PAIN SCALES - GENERAL: PAINLEVEL: NO PAIN (0)

## 2019-10-03 ASSESSMENT — MIFFLIN-ST. JEOR: SCORE: 2114.62

## 2019-10-03 NOTE — LETTER
October 10, 2019       TO: Mark Gomez  [address] [facility] [address]       DearMr.Jason,    We are writing to inform you of your test results.        Resulted Orders   Testosterone Free and Total   Result Value Ref Range    Testosterone Total 399 240 - 950 ng/dL      Comment:      This test was developed and its performance characteristics determined by the   [facility],  [facility]. It has   not been cleared or approved by the FDA. The laboratory is regulated under   CLIA as qualified to perform high-complexity testing. This test is used for   clinical purposes. It should not be regarded as investigational or for   research.      Sex Hormone Binding Globulin 46 11 - 80 nmol/L    Free Testosterone Calculated 6.54 4.7 - 24.4 ng/dL   Follicle stimulating hormone   Result Value Ref Range    FSH 3.2 0.7 - 10.8 IU/L   Estradiol ultrasensitive   Result Value Ref Range    Estradiol Ultrasensitive 21 10 - 40 pg/mL      Comment:      Reference Ranges  Prepubertal Males:  0-13 pg/mL  Adult Males:  10-40 pg/mL  This test was developed and its performance characteristics determined by the   [facility],  [facility]. It has   not been cleared or approved by the FDA. The laboratory is regulated under   CLIA as qualified to perform high-complexity testing. This test is used for   clinical purposes. It should not be regarded as investigational or for   research.         Dr Mark Cordero

## 2019-10-03 NOTE — NURSING NOTE
Chief Complaint   Patient presents with     Consult     New patient consult for infertility     Lisa Carrillo, [facility]

## 2019-10-03 NOTE — PROGRESS NOTES
Dear Dr. Crane, it was my pleasure to see . Mark Gomez, a 43 year old male here in consultation today for fertility evaluation.      HPI  Mark Gomez is a 43 year old male with no significant PMH.  He and his partner have been attempting to conceive for the last 2 years.  They have no previous pregnancy together.  Wife was pregnant but miscarried after 11 weeks. Mark has no pregnacies with other partners reported.  They have tried timed intercourse for, including OPK. Did not try BBT. They have not tried IUI or IVF.  They do not use lubrication during intercourse.  He does have some difficulties with erections - every once in a while he can't sustain erections.     He did have a back surgery - accessed from right buttock. No records on file and he does not remember the nature of the surgery other than he believes it was due to a nerve impingement.     Wife also mentions that he takes supplements online (Prime Test Testosterone Booster).    The patient's spouse, Grisel, is 38 years old.  She is in good health.  She had a miscarriage 5/5/2009.  She has irregular monthly menstrual cycles. Sometimes 28d to 39 days, usually lasting 3-4 days. She has not been evaluated for infertility. She has a known uterine fibroid (exophytic) and is scheduled for surgery. Ob performed US, lumen of uterus appears normal.       PAST MEDICAL HISTORY:    Past Medical History:   Diagnosis Date     NO ACTIVE PROBLEMS       Puberty normal   No associated conditions such as ED or sexual dysfunction.  No  problems.     PAST SURG HISTORY  Past Surgical History:   Procedure Laterality Date     SURGICAL HISTORY OF -   child     strabismus      SURGICAL HISTORY OF -   age 13     right wrist fracture         Medications as of 10/3/2019:  No current outpatient medications on file.     No current facility-administered medications for this visit.         ALLERGY:   No Known Allergies    SOCIAL HISTORY:  . Occupation:  ". Drinks socially, binge drinks ~once/month, no tobacco use.   Social History     Tobacco Use     Smoking status: Former Smoker     Packs/day: 0.50     Years: 20.00     Pack years: 10.00     Types: Cigarettes     Last attempt to quit: 2013     Years since quittin.3     Smokeless tobacco: Never Used   Substance Use Topics     Alcohol use: Yes     Comment: 12 pk a week      Drug use: No       FAMILY HISTORY: No inherited disorders.   Father  of pancreatic cancer 1.5 years ago (BRCA2 positive)  Brother and sister both have kids and are otherwise healthy.    REVIEW OF SYSTEMS:  Reviewed. Negative as below.    Denies erectile dysfunction, ejaculatory problems, testicular pain. No vision or smell deficits, no chronic sinus or respiratory infections. No recent febrile illness, weight loss. No heat or cold intolerance, gynecomastia, or other endocrine complaints.    Otherwise, no constitutional, eye, ENT, heart, lung, GI , musculoskeletal, skin, neurologic, psychiatric, or hematologic complaints.    GONADOTOXIN EXPOSURE: Unremarkable. Otherwise negative for marijuana, heat, chemicals, pesticides, heavy metals, steroids, chemotherapy or radiation.    GENERAL PHYSICAL EXAM  BP (!) 143/93   Pulse 97   Ht 1.905 m (6' 3\")   Wt 113.4 kg (250 lb)   BMI 31.25 kg/m     Constitutional: No acute distress. Well nourished.   PSYCH: normal mood and affect.  NEURO: normal gait, no focal deficits.   EYES: anicteric, EOMI, PERR  ENT: neck supple,  mucosae moist, no thrush.  CARDIOPULMONARY: breathing non-labored, pulse regular, no peripheral edema.  GI: Abdomen soft, non-tender, no surgical scars, no organomegaly.  MUSCULOSKELETAL: normal limb proportions, no muscle wasting, no contractures.  SKIN: Normal virilized hair distribution, no lesions, warts or rashes over genitalia, abdomen extremities or face.  HEME/LYMPH: no ecchymosis, no lymphadenopathy in groin or neck, no lymphedema.     EXAM:  Phallus " circumcised, meatus adequate, no plaques palpated.   Left testis descended , size is 20 cc , consistency is smooth. No intra-testicular masses.   Right testis descended , size is 20 cc , consistency is smooth. No intra-testicular masses.   Epididymes present, non-tender, not enlarged.   Left cord: Vas present. no varicocele noted.  Right cord: Vas present. no varicocele noted.     Rectal exam deferred.     LABS:     Component      Latest Ref Rng & Units 7/11/2019   Collection Method       Masturbation   Collection Site       CELESTE   Specimen Type       Semen   Lab Receipt Time       7:24 AM   Time of Analysis       7:40 AM   Analysis Temp - Centigrade      centigrade 24   Abstinence days      2 - 7 days 4   Liquefied      yes/no Yes   Viscous      yes/no No   Agglutination      yes/no No   pH      7.2 pH 7.6   Volume      1.5 ml 3.2   Concentration      15 million/ml 113   Total Number      39 million 362   Progressive motility      32 % 67   Non-progressive motility      % 3   Immotile      % 30   Total Progressive Motile      15.6 million 243   Normal Sperm      4 % normal forms 1 (A)   Abnormal Sperm      morphology 99   Head Defect       98   Midpiece Defect       46   Tail Defect       12   Round Cells      2 million/ml 0.2   Consent to Release to Partner       Yes       IMAGING:  No recent relevant imaging    ASSESSMENT:   Isolated teratospermia  No varicocele noted.    We discussed the nature of low sperm morphology and how this number is obtained. There is variability between labs and between semen samples and we recommended a repeat SA. He and his wife were given an information sheet on sperm morphology.    PLAN:    Hormonal panel including testosterone, estradiol, and FSH    Repeat SA with capacitation score testing.    Discussed over-the-counter fertility supplements to consider such as FertilAid for Men.  I don't know that his other over-the-counter T booster would help or harm fertility, but I generally  would say be off of unneeded supplements.    Discussed assisted reproductive technology options, IUI / IVF    Dr. Cordero will contact patient with results and plan/options    Patient was seen and examined with Dr. Gil Rodriguez MD  Urology Resident      I saw and examined the patient with the resident today.  I agree with the resident note and plan of care as above.     Mark Cordero MD  Urology Staff

## 2019-10-03 NOTE — LETTER
10/3/2019       RE: Mark Gomez  [address] [facility] [address]     Dear Colleague,    Thank you for referring your patient, Mark Gomez, to the [facility] at [facility]. Please see a copy of my visit note below.    Dear Dr. Crane, it was my pleasure to see Mr. Mark Gomez, a 43 year old male here in consultation today for fertility evaluation.      HPI  Mark Gomez is a 43 year old male with no significant PMH.  He and his partner have been attempting to conceive for the last 2 years.  They have no previous pregnancy together.  Wife was pregnant but miscarried after 11 weeks. Mark has no pregnacies with other partners reported.  They have tried timed intercourse for, including OPK. Did not try BBT. They have not tried IUI or IVF.  They do not use lubrication during intercourse.  He does have some difficulties with erections - every once in a while he can't sustain erections.     He did have a back surgery - accessed from right buttock. No records on file and he does not remember the nature of the surgery other than he believes it was due to a nerve impingement.     Wife also mentions that he takes supplements online (Prime Test Testosterone Booster).    The patient's spouse, Grisel, is 38 years old.  She is in good health.  She had a miscarriage 5/5/2009.  She has irregular monthly menstrual cycles. Sometimes 28d to 39 days, usually lasting 3-4 days. She has not been evaluated for infertility. She has a known uterine fibroid (exophytic) and is scheduled for surgery. Ob performed US, lumen of uterus appears normal.       PAST MEDICAL HISTORY:    Past Medical History:   Diagnosis Date     NO ACTIVE PROBLEMS       Puberty normal   No associated conditions such as ED or sexual dysfunction.  No  problems.     PAST SURG HISTORY  Past Surgical History:   Procedure Laterality Date     SURGICAL HISTORY  "OF -   child     strabismus      SURGICAL HISTORY OF -   age 13     right wrist fracture         Medications as of 10/3/2019:  No current outpatient medications on file.     No current facility-administered medications for this visit.         ALLERGY:   No Known Allergies    SOCIAL HISTORY:  . Occupation: . Drinks socially, binge drinks ~once/month, no tobacco use.   Social History     Tobacco Use     Smoking status: Former Smoker     Packs/day: 0.50     Years: 20.00     Pack years: 10.00     Types: Cigarettes     Last attempt to quit: 2013     Years since quittin.3     Smokeless tobacco: Never Used   Substance Use Topics     Alcohol use: Yes     Comment: 12 pk a week      Drug use: No       FAMILY HISTORY: No inherited disorders.   Father  of pancreatic cancer 1.5 years ago (BRCA2 positive)  Brother and sister both have kids and are otherwise healthy.    REVIEW OF SYSTEMS:  Reviewed. Negative as below.    Denies erectile dysfunction, ejaculatory problems, testicular pain. No vision or smell deficits, no chronic sinus or respiratory infections. No recent febrile illness, weight loss. No heat or cold intolerance, gynecomastia, or other endocrine complaints.    Otherwise, no constitutional, eye, ENT, heart, lung, GI , musculoskeletal, skin, neurologic, psychiatric, or hematologic complaints.    GONADOTOXIN EXPOSURE: Unremarkable. Otherwise negative for marijuana, heat, chemicals, pesticides, heavy metals, steroids, chemotherapy or radiation.    GENERAL PHYSICAL EXAM  BP (!) 143/93   Pulse 97   Ht 1.905 m (6' 3\")   Wt 113.4 kg (250 lb)   BMI 31.25 kg/m      Constitutional: No acute distress. Well nourished.   PSYCH: normal mood and affect.  NEURO: normal gait, no focal deficits.   EYES: anicteric, EOMI, PERR  ENT: neck supple,  mucosae moist, no thrush.  CARDIOPULMONARY: breathing non-labored, pulse regular, no peripheral edema.  GI: Abdomen soft, non-tender, no surgical scars, " no organomegaly.  MUSCULOSKELETAL: normal limb proportions, no muscle wasting, no contractures.  SKIN: Normal virilized hair distribution, no lesions, warts or rashes over genitalia, abdomen extremities or face.  HEME/LYMPH: no ecchymosis, no lymphadenopathy in groin or neck, no lymphedema.     EXAM:  Phallus circumcised, meatus adequate, no plaques palpated.   Left testis descended , size is 20 cc , consistency is smooth. No intra-testicular masses.   Right testis descended , size is 20 cc , consistency is smooth. No intra-testicular masses.   Epididymes present, non-tender, not enlarged.   Left cord: Vas present. no varicocele noted.  Right cord: Vas present. no varicocele noted.     Rectal exam deferred.     LABS:     Component      Latest Ref Rng & Units 7/11/2019   Collection Method       Masturbation   Collection Site       CELESTE   Specimen Type       Semen   Lab Receipt Time       7:24 AM   Time of Analysis       7:40 AM   Analysis Temp - Centigrade      centigrade 24   Abstinence days      2 - 7 days 4   Liquefied      yes/no Yes   Viscous      yes/no No   Agglutination      yes/no No   pH      7.2 pH 7.6   Volume      1.5 ml 3.2   Concentration      15 million/ml 113   Total Number      39 million 362   Progressive motility      32 % 67   Non-progressive motility      % 3   Immotile      % 30   Total Progressive Motile      15.6 million 243   Normal Sperm      4 % normal forms 1 (A)   Abnormal Sperm      morphology 99   Head Defect       98   Midpiece Defect       46   Tail Defect       12   Round Cells      2 million/ml 0.2   Consent to Release to Partner       Yes       IMAGING:  No recent relevant imaging    ASSESSMENT:   Isolated teratospermia  No varicocele noted.    We discussed the nature of low sperm morphology and how this number is obtained. There is variability between labs and between semen samples and we recommended a repeat SA. He and his wife were given an information sheet on sperm  morphology.    PLAN:    Hormonal panel including testosterone, estradiol, and FSH    Repeat SA with capacitation score testing.    Discussed over-the-counter fertility supplements to consider such as FertilAid for Men.  I don't know that his other over-the-counter T booster would help or harm fertility, but I generally would say be off of unneeded supplements.    Discussed assisted reproductive technology options, IUI / IVF    Dr. Cordero will contact patient with results and plan/options    Patient was seen and examined with Dr. Gil Rodriguez MD  Urology Resident      I saw and examined the patient with the resident today.  I agree with the resident note and plan of care as above.     Mark Cordero MD  Urology Staff

## 2019-10-05 LAB
SHBG SERPL-SCNC: 46 NMOL/L (ref 11–80)
TESTOST FREE SERPL-MCNC: 6.54 NG/DL (ref 4.7–24.4)
TESTOST SERPL-MCNC: 399 NG/DL (ref 240–950)

## 2019-10-08 LAB — ESTRADIOL SERPL HS-MCNC: 21 PG/ML (ref 10–40)

## 2019-12-12 DIAGNOSIS — R86.8 TERATOSPERMIA: ICD-10-CM

## 2019-12-12 DIAGNOSIS — Z31.41 FERTILITY TESTING: ICD-10-CM

## 2019-12-12 PROCEDURE — 89322 SEMEN ANAL STRICT CRITERIA: CPT

## 2019-12-13 LAB
ABNORMAL SPERM: 95 MORPHOLOGY
ABSTINENCE DAYS: 7 DAYS (ref 2–7)
AGGLUTINATION: NO YES/NO
ANALYSIS TEMP - CENTIGRADE: 22 CENTIGRADE
CELL FRAGMENTS: NORMAL %
COLLECTION METHOD: NORMAL
COLLECTION SITE: NORMAL
CONSENT TO RELEASE TO PARTNER: YES
HEAD DEFECT: 96
IMMATURE SPERM: NORMAL %
IMMOTILE: 39 %
LAB RECEIPT TIME: NORMAL
LIQUEFIED: YES YES/NO
MIDPIECE DEFECT: 62
NON-PROGRESSIVE MOTILITY: 6 %
NORMAL SPERM: 5 % NORMAL FORMS (ref 4–?)
PROGRESSIVE MOTILITY: 55 % (ref 32–?)
ROUND CELLS: 0.4 MILLION/ML (ref ?–2)
SPECIMEN CONCENTRATION: 197 MILLION/ML (ref 15–?)
SPECIMEN PH: 7.6 PH (ref 7.2–?)
SPECIMEN TYPE: NORMAL
SPECIMEN VOL UR: 4 ML (ref 1.5–?)
TAIL DEFECT: 7
TIME OF ANALYSIS: NORMAL
TOTAL NUMBER: 788 MILLION (ref 39–?)
TOTAL PROGRESSIVE MOTILE: 433 MILLION (ref 15.6–?)
VISCOUS: NO YES/NO
VITALITY: NORMAL % (ref 58–?)
WBC SPECIMEN: NORMAL %

## 2019-12-16 NOTE — RESULT ENCOUNTER NOTE
Dear Mark     Here are your recent test results which are NORMAL. Sperm count and quality are much higher.  Keep doing whatever you are doing that is making the numbers better.        Thank You,    Please let me know if you have any questions!    Karl CHURCHILL

## 2020-03-01 ENCOUNTER — HEALTH MAINTENANCE LETTER (OUTPATIENT)
Age: 45
End: 2020-03-01

## 2020-12-14 ENCOUNTER — HEALTH MAINTENANCE LETTER (OUTPATIENT)
Age: 45
End: 2020-12-14

## 2021-03-03 ENCOUNTER — OFFICE VISIT (OUTPATIENT)
Dept: FAMILY MEDICINE | Facility: CLINIC | Age: 46
End: 2021-03-03
Payer: COMMERCIAL

## 2021-03-03 VITALS
OXYGEN SATURATION: 98 % | SYSTOLIC BLOOD PRESSURE: 124 MMHG | TEMPERATURE: 97.6 F | BODY MASS INDEX: 33.82 KG/M2 | DIASTOLIC BLOOD PRESSURE: 80 MMHG | HEIGHT: 75 IN | WEIGHT: 272 LBS | RESPIRATION RATE: 16 BRPM | HEART RATE: 84 BPM

## 2021-03-03 DIAGNOSIS — R73.09 ELEVATED GLUCOSE: ICD-10-CM

## 2021-03-03 DIAGNOSIS — E78.5 HYPERLIPIDEMIA LDL GOAL <100: ICD-10-CM

## 2021-03-03 DIAGNOSIS — Z00.00 WELL ADULT EXAM: Primary | ICD-10-CM

## 2021-03-03 LAB
CHOLEST SERPL-MCNC: 165 MG/DL
GLUCOSE SERPL-MCNC: 100 MG/DL (ref 70–99)
HDLC SERPL-MCNC: 62 MG/DL
LDLC SERPL CALC-MCNC: 76 MG/DL
NONHDLC SERPL-MCNC: 103 MG/DL
TRIGL SERPL-MCNC: 136 MG/DL

## 2021-03-03 PROCEDURE — 99386 PREV VISIT NEW AGE 40-64: CPT | Performed by: FAMILY MEDICINE

## 2021-03-03 PROCEDURE — 80061 LIPID PANEL: CPT | Performed by: FAMILY MEDICINE

## 2021-03-03 PROCEDURE — 82947 ASSAY GLUCOSE BLOOD QUANT: CPT | Performed by: FAMILY MEDICINE

## 2021-03-03 PROCEDURE — 36415 COLL VENOUS BLD VENIPUNCTURE: CPT | Performed by: FAMILY MEDICINE

## 2021-03-03 ASSESSMENT — ENCOUNTER SYMPTOMS
ABDOMINAL PAIN: 0
PARESTHESIAS: 0
HEMATURIA: 0
DIARRHEA: 0
CHILLS: 0
HEADACHES: 0
WEAKNESS: 0
HEARTBURN: 0
NERVOUS/ANXIOUS: 0
MYALGIAS: 0
SHORTNESS OF BREATH: 0
FEVER: 0
FREQUENCY: 0
JOINT SWELLING: 0
ARTHRALGIAS: 0
HEMATOCHEZIA: 0
SORE THROAT: 0
COUGH: 0
CONSTIPATION: 0
PALPITATIONS: 0
EYE PAIN: 0
DIZZINESS: 0
DYSURIA: 0
NAUSEA: 0

## 2021-03-03 ASSESSMENT — MIFFLIN-ST. JEOR: SCORE: 2204.41

## 2021-03-03 NOTE — PROGRESS NOTES
SUBJECTIVE:   CC: Mark Gomez is an 45 year old male who presents for preventative health visit.       Healthy Habits:     Getting at least 3 servings of Calcium per day:  Yes    Bi-annual eye exam:  NO    Dental care twice a year:  Yes    Sleep apnea or symptoms of sleep apnea:  Excessive snoring    Diet:  Regular (no restrictions)    Frequency of exercise:  1 day/week    Duration of exercise:  Less than 15 minutes    Taking medications regularly:  Yes    Medication side effects:  None    PHQ-2 Total Score: 0    Additional concerns today:  No      Elevated glucose: recheck.    Hyperlipidemia: recheck    Doing well.          Today's PHQ-2 Score:   PHQ-2 (  [employer]) 3/3/2021   Q1: Little interest or pleasure in doing things 0   Q2: Feeling down, depressed or hopeless 0   PHQ-2 Score 0   Q1: Little interest or pleasure in doing things Not at all   Q2: Feeling down, depressed or hopeless Not at all   PHQ-2 Score 0       Abuse: Current or Past(Physical, Sexual or Emotional)- No  Do you feel safe in your environment? Yes    Have you ever done Advance Care Planning? (For example, a Health Directive, POLST, or a discussion with a medical provider or your loved ones about your wishes): No, advance care planning information given to patient to review.  Patient declined advance care planning discussion at this time.    Social History     Tobacco Use     Smoking status: Former Smoker     Packs/day: 0.50     Years: 20.00     Pack years: 10.00     Types: Cigarettes     Quit date: 2013     Years since quittin.7     Smokeless tobacco: Never Used   Substance Use Topics     Alcohol use: Yes     Comment: 12 pk a week      If you drink alcohol do you typically have >3 drinks per day or >7 drinks per week? Yes      Alcohol Use 3/3/2021   Prescreen: >3 drinks/day or >7 drinks/week? No   Prescreen: >3 drinks/day or >7 drinks/week? -   No flowsheet data found.    Last PSA: No results found for: PSA    Reviewed orders with  "patient. Reviewed health maintenance and updated orders accordingly -   Reviewed and updated as needed this visit by clinical staff                 Reviewed and updated as needed this visit by Provider                    Review of Systems   Constitutional: Negative for chills and fever.   HENT: Negative for congestion, ear pain, hearing loss and sore throat.    Eyes: Negative for pain and visual disturbance.   Respiratory: Negative for cough and shortness of breath.    Cardiovascular: Negative for chest pain, palpitations and peripheral edema.   Gastrointestinal: Negative for abdominal pain, constipation, diarrhea, heartburn, hematochezia and nausea.   Genitourinary: Negative for discharge, dysuria, frequency, genital sores, hematuria, impotence and urgency.   Musculoskeletal: Negative for arthralgias, joint swelling and myalgias.   Skin: Negative for rash.   Neurological: Negative for dizziness, weakness, headaches and paresthesias.   Psychiatric/Behavioral: Negative for mood changes. The patient is not nervous/anxious.          OBJECTIVE:   /80   Pulse 84   Temp 97.6  F (36.4  C) (Tympanic)   Resp 16   Ht 1.905 m (6' 3\")   Wt 123.4 kg (272 lb)   SpO2 98%   BMI 34.00 kg/m      Physical Exam   Gen: alert and oriented, in no acute distress, affect within normal limits  Neck: supple with no masses or nodes  Throat: oropharynx clear, no exudate or tonsillar/palate asymmetry.    CV: RRR, no murmur  Lungs: clear bilaterally with good effort  Abd: nontender, no mass  Ext: no edema or lesions   Neuro: moving all extremities, gait normal, no focal deficts noted      ASSESSMENT/PLAN:   Well exam  Elevated glucose, recheck  Hyperlipidemia, recheck    Work on wt loss.  Update labs.  Doing well.      Patient has been advised of split billing requirements and indicates understanding: Yes  COUNSELING:   Reviewed preventive health counseling, as reflected in patient instructions       Regular exercise       Healthy " "diet/nutrition    Estimated body mass index is 31.25 kg/m  as calculated from the following:    Height as of 10/3/19: 1.905 m (6' 3\").    Weight as of 10/3/19: 113.4 kg (250 lb).     Weight management plan: diet/exercise    He reports that he quit smoking about 7 years ago. His smoking use included cigarettes. He has a 10.00 pack-year smoking history. He has never used smokeless tobacco.          Mahamed Martin MD  [facility]  "

## 2021-10-02 ENCOUNTER — HEALTH MAINTENANCE LETTER (OUTPATIENT)
Age: 46
End: 2021-10-02

## 2022-01-02 ENCOUNTER — OFFICE VISIT (OUTPATIENT)
Dept: URGENT CARE | Facility: URGENT CARE | Age: 47
End: 2022-01-02
Payer: COMMERCIAL

## 2022-01-02 VITALS
BODY MASS INDEX: 30.85 KG/M2 | DIASTOLIC BLOOD PRESSURE: 94 MMHG | TEMPERATURE: 98.1 F | RESPIRATION RATE: 16 BRPM | SYSTOLIC BLOOD PRESSURE: 134 MMHG | HEART RATE: 89 BPM | WEIGHT: 246.8 LBS | OXYGEN SATURATION: 96 %

## 2022-01-02 DIAGNOSIS — R07.0 THROAT PAIN: Primary | ICD-10-CM

## 2022-01-02 DIAGNOSIS — J32.9 OTHER SINUSITIS, UNSPECIFIED CHRONICITY: ICD-10-CM

## 2022-01-02 LAB — DEPRECATED S PYO AG THROAT QL EIA: NEGATIVE

## 2022-01-02 PROCEDURE — 99213 OFFICE O/P EST LOW 20 MIN: CPT | Performed by: EMERGENCY MEDICINE

## 2022-01-02 PROCEDURE — 87651 STREP A DNA AMP PROBE: CPT | Performed by: EMERGENCY MEDICINE

## 2022-01-02 NOTE — PATIENT INSTRUCTIONS
Start antibiotics today    Buy and use Flonase every day for 2 weeks    Recheck 1 week if no improvement, sooner if worse.  Patient Education     Sinusitis (Antibiotic Treatment)    The sinuses are air-filled spaces within the bones of the face. They connect to the inside of the nose. Sinusitis is an inflammation of the tissue that lines the sinuses. Sinusitis can occur during a cold. It can also happen due to allergies to pollens and other particles in the air. Sinusitis can cause symptoms of sinus congestion and a feeling of fullness. A sinus infection causes fever, headache, and facial pain. There is often green or yellow fluid draining from the nose or into the back of the throat (post-nasal drip). You have been given antibiotics to treat this condition.   Home care  Take the full course of antibiotics as instructed. Don't stop taking them, even when you feel better.  Drink plenty of water, hot tea, and other liquids as directed by the healthcare provider. This may help thin nasal mucus. It also may help your sinuses drain fluids.  Heat may help soothe painful areas of your face. Use a towel soaked in hot water. Or,  the shower and direct the warm spray onto your face. Using a vaporizer along with a menthol rub at night may also help soothe symptoms.   An expectorant with guaifenesin may help thin nasal mucus and help your sinuses drain fluids. Talk with your provider or pharmacists before taking an over-the-counter (OTC) medicine if you have any questions about it or its side effects..  You can use an OTC decongestant, unless a similar medicine was prescribed to you. Nasal sprays work the fastest. Use one that contains phenylephrine or oxymetazoline. First blow your nose gently. Then use the spray. Don't use these medicines more often than directed on the label. If you do, your symptoms may get worse. You may also take pills that contain pseudoephedrine. Don t use products that combine multiple  medicines. This is because side effects may be increased. Read labels. You can also ask the pharmacist for help. (People with high blood pressure should not use decongestants. They can raise blood pressure.) Talk with your provider or pharmacist if you have any questions about the medicine..  OTC antihistamines may help if allergies contributed to your sinusitis. Talk with your provider or pharmacist if you have any questions about the medicine..  Don't use nasal rinses or irrigation during an acute sinus infection, unless your healthcare provider tells you to. Rinsing may spread the infection to other areas in your sinuses.  Use acetaminophen or ibuprofen to control pain, unless another pain medicine was prescribed to you. If you have chronic liver or kidney disease or ever had a stomach ulcer, talk with your healthcare provider before using these medicines. Never give aspirin to anyone under age 18 who is ill with a fever. It may cause severe liver damage.  Don't smoke. This can make symptoms worse.    Follow-up care  Follow up with your healthcare provider, or as advised.   When to seek medical advice  Call your healthcare provider if any of these occur:   Facial pain or headache that gets worse  Stiff neck  Unusual drowsiness or confusion  Swelling of your forehead or eyelids  Symptoms don't go away in 10 days  Vision problems, such as blurred or double vision  Fever of 100.4 F (38 C) or higher, or as directed by your healthcare provider  Call 911  Call 911 if any of these occur:   Seizure  Trouble breathing  Feeling dizzy or faint  Fingernails, skin or lips look blue, purple , or gray  Prevention  Here are steps you can take to help prevent an infection:   Keep good hand washing habits.  Don t have close contact with people who have sore throats, colds, or other upper respiratory infections.  Don t smoke, and stay away from secondhand smoke.  Stay up to date with of your vaccines.  Karlene last reviewed this  educational content on 12/1/2019 2000-2021 [employer]. All rights reserved. This information is not intended as a substitute for professional medical care. Always follow your healthcare professional's instructions.

## 2022-01-02 NOTE — PROGRESS NOTES
CHIEF COMPLAINT: Persistent URI symptoms      HPI: Patient is a 46-year-old male whose had URI symptoms of sinus congestion and sore throat for 3 weeks.  No fever.  No obvious Covid exposure.      ROS: See HPI otherwise normal.    No Known Allergies   Current Outpatient Medications   Medication Sig Dispense Refill     amoxicillin-clavulanate (AUGMENTIN) 875-125 MG tablet Take 1 tablet by mouth 2 times daily for 7 days 14 tablet 0         PE: Physical exam reveals a 46-year-old male to be in no acute distress.  Is afebrile with a pulse oximetry 96%.  HEENT reveals moist oral mucous membranes.  Posterior pharynx slightly erythematous without exudate or abscess.  Face reveals maxillary sinus tenderness.  Ears reveal normal TMs with no signs of infection.  Lungs are clear throughout with no wheezes rales or rhonchi heard.        TREATMENT: Rapid strep negative      ASSESSMENT: Sinusitis.  Without serious associated symptoms.      DIAGNOSIS: Sinusitis      PLAN: Augmentin as instructed follow-up 1 week if no better.

## 2022-01-03 LAB — GROUP A STREP BY PCR: NOT DETECTED

## 2022-02-10 ENCOUNTER — MYC MEDICAL ADVICE (OUTPATIENT)
Dept: FAMILY MEDICINE | Facility: CLINIC | Age: 47
End: 2022-02-10
Payer: COMMERCIAL

## 2022-05-08 ENCOUNTER — HEALTH MAINTENANCE LETTER (OUTPATIENT)
Age: 47
End: 2022-05-08

## 2022-08-21 ENCOUNTER — HOSPITAL ENCOUNTER (EMERGENCY)
Facility: CLINIC | Age: 47
Discharge: HOME | End: 2022-08-21
Attending: NURSE PRACTITIONER | Admitting: NURSE PRACTITIONER
Payer: COMMERCIAL

## 2022-08-21 VITALS
DIASTOLIC BLOOD PRESSURE: 85 MMHG | RESPIRATION RATE: 28 BRPM | OXYGEN SATURATION: 91 % | WEIGHT: 260 LBS | SYSTOLIC BLOOD PRESSURE: 140 MMHG | BODY MASS INDEX: 32.33 KG/M2 | TEMPERATURE: 100.5 F | HEART RATE: 106 BPM | HEIGHT: 75 IN

## 2022-08-21 DIAGNOSIS — R50.9 FEVER, UNSPECIFIED: ICD-10-CM

## 2022-08-21 LAB
ALBUMIN SERPL-MCNC: 3.9 G/DL (ref 3.4–5)
ALBUMIN UR-MCNC: NEGATIVE MG/DL
ALP SERPL-CCNC: 52 U/L (ref 40–150)
ALT SERPL W P-5'-P-CCNC: 49 U/L (ref 0–70)
AMORPH CRY #/AREA URNS HPF: ABNORMAL /HPF
ANION GAP SERPL CALCULATED.3IONS-SCNC: 5 MMOL/L (ref 3–14)
APPEARANCE UR: CLEAR
AST SERPL W P-5'-P-CCNC: 35 U/L (ref 0–45)
BACTERIA #/AREA URNS HPF: ABNORMAL /HPF
BASOPHILS # BLD AUTO: 0 10E3/UL (ref 0–0.2)
BASOPHILS NFR BLD AUTO: 0 %
BILIRUB SERPL-MCNC: 0.7 MG/DL (ref 0.2–1.3)
BILIRUB UR QL STRIP: NEGATIVE
BUN SERPL-MCNC: 14 MG/DL (ref 7–30)
CALCIUM SERPL-MCNC: 8.8 MG/DL (ref 8.5–10.1)
CHLORIDE BLD-SCNC: 107 MMOL/L (ref 94–109)
CO2 SERPL-SCNC: 28 MMOL/L (ref 20–32)
COLOR UR AUTO: YELLOW
CREAT SERPL-MCNC: 0.9 MG/DL (ref 0.66–1.25)
DEPRECATED S PYO AG THROAT QL EIA: NEGATIVE
EOSINOPHIL # BLD AUTO: 0 10E3/UL (ref 0–0.7)
EOSINOPHIL NFR BLD AUTO: 0 %
ERYTHROCYTE [DISTWIDTH] IN BLOOD BY AUTOMATED COUNT: 12.3 % (ref 10–15)
FLUAV RNA SPEC QL NAA+PROBE: NEGATIVE
FLUBV RNA RESP QL NAA+PROBE: NEGATIVE
GFR SERPL CREATININE-BSD FRML MDRD: >90 ML/MIN/1.73M2
GLUCOSE BLD-MCNC: 98 MG/DL (ref 70–99)
GLUCOSE UR STRIP-MCNC: NEGATIVE MG/DL
GROUP A STREP BY PCR: NOT DETECTED
HCT VFR BLD AUTO: 45.5 % (ref 40–53)
HGB BLD-MCNC: 15.3 G/DL (ref 13.3–17.7)
HGB UR QL STRIP: NEGATIVE
IMM GRANULOCYTES # BLD: 0 10E3/UL
IMM GRANULOCYTES NFR BLD: 0 %
KETONES UR STRIP-MCNC: NEGATIVE MG/DL
LEUKOCYTE ESTERASE UR QL STRIP: NEGATIVE
LYMPHOCYTES # BLD AUTO: 0.9 10E3/UL (ref 0.8–5.3)
LYMPHOCYTES NFR BLD AUTO: 10 %
MCH RBC QN AUTO: 31.5 PG (ref 26.5–33)
MCHC RBC AUTO-ENTMCNC: 33.6 G/DL (ref 31.5–36.5)
MCV RBC AUTO: 94 FL (ref 78–100)
MONOCYTES # BLD AUTO: 1.1 10E3/UL (ref 0–1.3)
MONOCYTES NFR BLD AUTO: 13 %
MUCOUS THREADS #/AREA URNS LPF: PRESENT /LPF
NEUTROPHILS # BLD AUTO: 6.6 10E3/UL (ref 1.6–8.3)
NEUTROPHILS NFR BLD AUTO: 77 %
NITRATE UR QL: NEGATIVE
NRBC # BLD AUTO: 0 10E3/UL
NRBC BLD AUTO-RTO: 0 /100
PH UR STRIP: 8 [PH] (ref 5–7)
PLATELET # BLD AUTO: 209 10E3/UL (ref 150–450)
POTASSIUM BLD-SCNC: 4.1 MMOL/L (ref 3.4–5.3)
PROT SERPL-MCNC: 7.4 G/DL (ref 6.8–8.8)
RBC # BLD AUTO: 4.85 10E6/UL (ref 4.4–5.9)
RBC URINE: 2 /HPF
SARS-COV-2 RNA RESP QL NAA+PROBE: NEGATIVE
SODIUM SERPL-SCNC: 140 MMOL/L (ref 133–144)
SP GR UR STRIP: 1.01 (ref 1–1.03)
UROBILINOGEN UR STRIP-MCNC: NORMAL MG/DL
WBC # BLD AUTO: 8.7 10E3/UL (ref 4–11)
WBC URINE: <1 /HPF

## 2022-08-21 PROCEDURE — 258N000003 HC RX IP 258 OP 636: Performed by: NURSE PRACTITIONER

## 2022-08-21 PROCEDURE — 99284 EMERGENCY DEPT VISIT MOD MDM: CPT | Mod: CS | Performed by: NURSE PRACTITIONER

## 2022-08-21 PROCEDURE — 99283 EMERGENCY DEPT VISIT LOW MDM: CPT | Mod: CS,25 | Performed by: NURSE PRACTITIONER

## 2022-08-21 PROCEDURE — 87636 SARSCOV2 & INF A&B AMP PRB: CPT | Performed by: NURSE PRACTITIONER

## 2022-08-21 PROCEDURE — 87651 STREP A DNA AMP PROBE: CPT | Performed by: NURSE PRACTITIONER

## 2022-08-21 PROCEDURE — 36415 COLL VENOUS BLD VENIPUNCTURE: CPT | Performed by: NURSE PRACTITIONER

## 2022-08-21 PROCEDURE — 86618 LYME DISEASE ANTIBODY: CPT | Performed by: NURSE PRACTITIONER

## 2022-08-21 PROCEDURE — 85025 COMPLETE CBC W/AUTO DIFF WBC: CPT | Performed by: NURSE PRACTITIONER

## 2022-08-21 PROCEDURE — 81001 URINALYSIS AUTO W/SCOPE: CPT | Performed by: NURSE PRACTITIONER

## 2022-08-21 PROCEDURE — C9803 HOPD COVID-19 SPEC COLLECT: HCPCS | Performed by: NURSE PRACTITIONER

## 2022-08-21 PROCEDURE — 96360 HYDRATION IV INFUSION INIT: CPT | Performed by: NURSE PRACTITIONER

## 2022-08-21 PROCEDURE — 80053 COMPREHEN METABOLIC PANEL: CPT | Performed by: NURSE PRACTITIONER

## 2022-08-21 PROCEDURE — 250N000013 HC RX MED GY IP 250 OP 250 PS 637: Performed by: NURSE PRACTITIONER

## 2022-08-21 RX ORDER — ACETAMINOPHEN 500 MG
1000 TABLET ORAL ONCE
Status: COMPLETED | OUTPATIENT
Start: 2022-08-21 | End: 2022-08-21

## 2022-08-21 RX ORDER — SODIUM CHLORIDE 9 MG/ML
INJECTION, SOLUTION INTRAVENOUS CONTINUOUS
Status: DISCONTINUED | OUTPATIENT
Start: 2022-08-21 | End: 2022-08-21 | Stop reason: HOSPADM

## 2022-08-21 RX ORDER — IBUPROFEN 400 MG/1
800 TABLET, FILM COATED ORAL ONCE
Status: COMPLETED | OUTPATIENT
Start: 2022-08-21 | End: 2022-08-21

## 2022-08-21 RX ADMIN — SODIUM CHLORIDE 1000 ML: 9 INJECTION, SOLUTION INTRAVENOUS at 11:24

## 2022-08-21 RX ADMIN — ACETAMINOPHEN 1000 MG: 500 TABLET ORAL at 11:17

## 2022-08-21 RX ADMIN — IBUPROFEN 800 MG: 400 TABLET, FILM COATED ORAL at 12:56

## 2022-08-21 ASSESSMENT — ACTIVITIES OF DAILY LIVING (ADL)
ADLS_ACUITY_SCORE: 37
ADLS_ACUITY_SCORE: 37

## 2022-08-21 NOTE — ED TRIAGE NOTES
Pt here with generalized weakness and fatigue that started yesterday.      Triage Assessment     Row Name 08/21/22 1044       Triage Assessment (Adult)    Airway WDL WDL       Respiratory WDL    Respiratory WDL WDL       Skin Circulation/Temperature WDL    Skin Circulation/Temperature WDL WDL       Cardiac WDL    Cardiac WDL WDL       Peripheral/Neurovascular WDL    Peripheral Neurovascular WDL WDL       Cognitive/Neuro/Behavioral WDL    Cognitive/Neuro/Behavioral WDL WDL

## 2022-08-21 NOTE — DISCHARGE INSTRUCTIONS
Your labs are normal.  Negative Strep and Negative Covid-19 tests.  Your Lyme's Test result is still pending and should result in 1-2 days.   ---you will be contacted if this is positive.  As discussed, this could also be a viral illness.  Rest, drink plenty of fluids.  Tylenol 650 mg every 4-6 hours as needed for pain.  Ibuprofen 400-600 mg every 6-8 hours as needed for pain  (take with food, stop if causing stomach pains.)  Return to the emergency department for worsening such as vomiting-unable to keep fluids down, shortness of breath, chest pain, abdominal pain, or any new symptoms of concern.

## 2022-08-21 NOTE — ED PROVIDER NOTES
"  History     Chief Complaint   Patient presents with     Generalized Weakness     HPI  Mark Gomez is a 46 year old male who presents for evaluation of fever and fatigue. Symptoms started yesterday.  Patient was chopping wood yesterday, felt fine.  Afterwards his low back was sore and he attributed this to chopping wood.  He went out for dinner with his wife and she had told him he did not look well.  He was feeling mild \"weakness\" at the time.  He was able to eat dinner.  When he got home he went right to bed.  His wife massaged his back.  This morning he awoke feeling more weak and fatigued.  His back pain resolved.  He did eat breakfast today.      Denies nausea or vomiting.  Denies constipation or diarrhea. Denies chest pain or abdominal pain. Denies headache or neck pain. Denies respiratory symptoms.  Denies sore throat.  Denies urinary symptoms.  No known ill contacts.  He has an infant that attends , but is not currently sick..    Allergies:  No Known Allergies    Problem List:    Patient Active Problem List    Diagnosis Date Noted     Chronic low back pain 2012     Priority: Medium     Pudendal nerve block 12- [place] [facility].  [facility] [phone]       CARDIOVASCULAR SCREENING; LDL GOAL LESS THAN 160 10/31/2010     Priority: Medium        Past Medical History:    Past Medical History:   Diagnosis Date     NO ACTIVE PROBLEMS        Past Surgical History:    Past Surgical History:   Procedure Laterality Date     SURGICAL HISTORY OF -   child     strabismus      SURGICAL HISTORY OF -   age 13     right wrist fracture        Family History:    No family history on file.    Social History:  Marital Status:   [2]  Social History     Tobacco Use     Smoking status: Former Smoker     Packs/day: 0.50     Years: 20.00     Pack years: 10.00     Types: Cigarettes     Quit date: 2013     Years since quittin.2     Smokeless tobacco: Never Used   Substance Use Topics     " "Alcohol use: Yes     Comment: 12 pk a week      Drug use: No        Medications:    No current outpatient medications on file.        Review of Systems  As mentioned above in the history present illness. All other systems were reviewed and are negative.    Physical Exam   BP: 117/72  Pulse: (!) 123  Temp: (!) 100.8  F (38.2  C)  Resp: 20  Height: 190.5 cm (6' 3\")  Weight: 117.9 kg (260 lb)  SpO2: 95 %  Body mass index is 32.5 kg/m .      Physical Exam  Constitutional:       General: He is not in acute distress.     Appearance: He is well-developed. He is not ill-appearing.   HENT:      Head: Normocephalic and atraumatic.      Right Ear: Tympanic membrane and external ear normal.      Left Ear: Tympanic membrane and external ear normal.      Nose: Nose normal.      Mouth/Throat:      Mouth: Mucous membranes are moist.      Pharynx: Oropharynx is clear. Uvula midline. No pharyngeal swelling or posterior oropharyngeal erythema.      Tonsils: No tonsillar exudate or tonsillar abscesses.   Eyes:      Conjunctiva/sclera: Conjunctivae normal.   Cardiovascular:      Rate and Rhythm: Regular rhythm. Tachycardia present.      Heart sounds: Normal heart sounds. No murmur heard.     Comments: febrile  Pulmonary:      Effort: Pulmonary effort is normal. No respiratory distress.      Breath sounds: Normal breath sounds.   Musculoskeletal:         General: Normal range of motion.   Skin:     General: Skin is warm and dry.      Findings: No rash.   Neurological:      General: No focal deficit present.      Mental Status: He is alert and oriented to person, place, and time.         ED Course                 Procedures              Results for orders placed or performed during the hospital encounter of 08/21/22 (from the past 24 hour(s))   Symptomatic; Yes; 8/20/2022 Influenza A/B & SARS-CoV2 (COVID-19) Virus PCR Multiplex Nose    Specimen: Nose; Swab   Result Value Ref Range    Influenza A PCR Negative Negative    Influenza B PCR " Negative Negative    SARS CoV2 PCR Negative Negative    Narrative    Testing was performed using the sammie SARS-CoV-2 & Influenza A/B Assay on the sammie Jaye System. This test should be ordered for the detection of SARS-CoV-2 and influenza viruses in individuals who meet clinical and/or epidemiological criteria. Test performance is unknown in asymptomatic patients. This test is for in vitro diagnostic use under the FDA EUA for laboratories certified under CLIA to perform moderate and/or high complexity testing. This test has not been FDA cleared or approved. A negative result does not rule out the presence of PCR inhibitors in the specimen or target RNA in concentration below the limit of detection for the assay. If only one viral target is positive but coinfection with multiple targets is suspected, the sample should be re-tested with another FDA cleared, approved or authorized test, if coinfection would change clinical management. [facility] are certified under the Clinical Laboratory Improvement Amendments of 1988 (CLIA-88) as  qualified to perform moderate and/or high complexity laboratory testing.   CBC with platelets differential    Narrative    The following orders were created for panel order CBC with platelets differential.  Procedure                               Abnormality         Status                     ---------                               -----------         ------                     CBC with platelets and d...[[id number]]                      Final result                 Please view results for these tests on the individual orders.   Comprehensive metabolic panel   Result Value Ref Range    Sodium 140 133 - 144 mmol/L    Potassium 4.1 3.4 - 5.3 mmol/L    Chloride 107 94 - 109 mmol/L    Carbon Dioxide (CO2) 28 20 - 32 mmol/L    Anion Gap 5 3 - 14 mmol/L    Urea Nitrogen 14 7 - 30 mg/dL    Creatinine 0.90 0.66 - 1.25 mg/dL    Calcium 8.8 8.5 - 10.1 mg/dL    Glucose 98 70 - 99  mg/dL    Alkaline Phosphatase 52 40 - 150 U/L    AST 35 0 - 45 U/L    ALT 49 0 - 70 U/L    Protein Total 7.4 6.8 - 8.8 g/dL    Albumin 3.9 3.4 - 5.0 g/dL    Bilirubin Total 0.7 0.2 - 1.3 mg/dL    GFR Estimate >90 >60 mL/min/1.73m2   CBC with platelets and differential   Result Value Ref Range    WBC Count 8.7 4.0 - 11.0 10e3/uL    RBC Count 4.85 4.40 - 5.90 10e6/uL    Hemoglobin 15.3 13.3 - 17.7 g/dL    Hematocrit 45.5 40.0 - 53.0 %    MCV 94 78 - 100 fL    MCH 31.5 26.5 - 33.0 pg    MCHC 33.6 31.5 - 36.5 g/dL    RDW 12.3 10.0 - 15.0 %    Platelet Count 209 150 - 450 10e3/uL    % Neutrophils 77 %    % Lymphocytes 10 %    % Monocytes 13 %    % Eosinophils 0 %    % Basophils 0 %    % Immature Granulocytes 0 %    NRBCs per 100 WBC 0 <1 /100    Absolute Neutrophils 6.6 1.6 - 8.3 10e3/uL    Absolute Lymphocytes 0.9 0.8 - 5.3 10e3/uL    Absolute Monocytes 1.1 0.0 - 1.3 10e3/uL    Absolute Eosinophils 0.0 0.0 - 0.7 10e3/uL    Absolute Basophils 0.0 0.0 - 0.2 10e3/uL    Absolute Immature Granulocytes 0.0 <=0.4 10e3/uL    Absolute NRBCs 0.0 10e3/uL   Streptococcus A Rapid Screen w/Reflex to PCR    Specimen: Throat; Swab   Result Value Ref Range    Group A Strep antigen Negative Negative   UA with Microscopic reflex to Culture    Specimen: Urine, Midstream   Result Value Ref Range    Color Urine Yellow Colorless, Straw, Light Yellow, Yellow    Appearance Urine Clear Clear    Glucose Urine Negative Negative mg/dL    Bilirubin Urine Negative Negative    Ketones Urine Negative Negative mg/dL    Specific Gravity Urine 1.010 1.003 - 1.035    Blood Urine Negative Negative    pH Urine 8.0 (H) 5.0 - 7.0    Protein Albumin Urine Negative Negative mg/dL    Urobilinogen Urine Normal Normal, 2.0 mg/dL    Nitrite Urine Negative Negative    Leukocyte Esterase Urine Negative Negative    Bacteria Urine Few (A) None Seen /HPF    Mucus Urine Present (A) None Seen /LPF    Amorphous Crystals Urine Moderate (A) None Seen /HPF    RBC Urine 2 <=2  "/HPF    WBC Urine <1 <=5 /HPF    Narrative    Urine Culture not indicated       Medications   0.9% sodium chloride BOLUS (0 mLs Intravenous Stopped 8/21/22 1246)     Followed by   sodium chloride 0.9% infusion (has no administration in time range)   acetaminophen (TYLENOL) tablet 1,000 mg (1,000 mg Oral Given 8/21/22 1117)   ibuprofen (ADVIL/MOTRIN) tablet 800 mg (800 mg Oral Given 8/21/22 1256)       Assessments & Plan (with Medical Decision Making)    46 year old male who presents for evaluation of fever and fatigue. Symptoms started yesterday.  Patient was chopping wood yesterday, felt fine.  Afterwards his low back was sore and he attributed this to chopping wood.  He went out for dinner with his wife and she had told him he did not look well.  He was feeling mild \"weakness\" at the time.  He was able to eat dinner.  When he got home he went right to bed.  His wife massaged his back.  This morning he awoke feeling more weak and fatigued.  His back pain resolved.  He did eat breakfast today.      Denies nausea or vomiting.  Denies constipation or diarrhea. Denies chest pain or abdominal pain. Denies headache or neck pain. Denies respiratory symptoms.  Denies sore throat.  Denies urinary symptoms.  No known ill contacts.  He has an infant that attends , but is not currently sick.    On exam patient is alert and oriented. Temp 100.8. normotensive. HR 123bpm. Lung sounds CTA. No hypoxia. No nasal congestion. No other concerning exam findings.   CBC and Comprehensive metabolic panel is normal.  UA is negative for evidence of infection.  Covid-19 PCR is negative.  Rapid strep is negative/throat culture pending.  Lyme's is pending.    Patient was given Tylenol 1000 mg po for his fever. IV NS 1 liter bolus.   HR improved and came down to 100bpm.  Unclear etiology for his fever and fatigue. No indication for chest imaging- Patient is not having respiratory symptoms. This could be a viral syndrome and still possible " that this is Lyme's disease.    Plan:  Your labs are normal.  Negative Strep and Negative Covid-19 tests.  Your Lyme's Test result is still pending and should result in 1-2 days.   ---you will be contacted if this is positive.  As discussed, this could also be a viral illness.  Rest, drink plenty of fluids.  Tylenol 650 mg every 4-6 hours as needed for pain.  Ibuprofen 400-600 mg every 6-8 hours as needed for pain  (take with food, stop if causing stomach pains.)  Return to the emergency department for worsening such as vomiting-unable to keep fluids down, shortness of breath, chest pain, abdominal pain, or any new symptoms of concern.      New Prescriptions    No medications on file       Final diagnoses:   Fever, unspecified       8/21/2022   [facility] EMERGENCY DEPT     Brianne Friedman APRN CNP  08/21/22 6635       Brianne Friedman APRN CNP  08/21/22 6112

## 2022-08-21 NOTE — ED NOTES
Pt reports generalized weakness and no energy that started last evening after splitting and stacking wood. Home covid test negative. Denies chest pain and or SOB.

## 2022-08-22 LAB — B BURGDOR IGG+IGM SER QL: 0.83

## 2022-08-22 NOTE — RESULT ENCOUNTER NOTE
Final result for Lyme Disease Total Abs Bld with Reflex to Confirm CLIA is NEGATIVE.    No change in treatment per [facility] Lab Result Lyme Disease protocol.

## 2022-09-08 ENCOUNTER — OFFICE VISIT (OUTPATIENT)
Dept: FAMILY MEDICINE | Facility: CLINIC | Age: 47
End: 2022-09-08
Payer: COMMERCIAL

## 2022-09-08 VITALS
RESPIRATION RATE: 24 BRPM | OXYGEN SATURATION: 97 % | TEMPERATURE: 97.4 F | BODY MASS INDEX: 33.52 KG/M2 | WEIGHT: 269.6 LBS | HEART RATE: 83 BPM | HEIGHT: 75 IN | SYSTOLIC BLOOD PRESSURE: 116 MMHG | DIASTOLIC BLOOD PRESSURE: 81 MMHG

## 2022-09-08 DIAGNOSIS — G47.10 EXCESSIVE SLEEPINESS: Primary | ICD-10-CM

## 2022-09-08 DIAGNOSIS — Z00.00 WELL ADULT EXAM: ICD-10-CM

## 2022-09-08 PROCEDURE — 99396 PREV VISIT EST AGE 40-64: CPT | Mod: 25 | Performed by: FAMILY MEDICINE

## 2022-09-08 PROCEDURE — 99213 OFFICE O/P EST LOW 20 MIN: CPT | Mod: 25 | Performed by: FAMILY MEDICINE

## 2022-09-08 PROCEDURE — 90715 TDAP VACCINE 7 YRS/> IM: CPT | Performed by: FAMILY MEDICINE

## 2022-09-08 PROCEDURE — 90471 IMMUNIZATION ADMIN: CPT | Performed by: FAMILY MEDICINE

## 2022-09-08 ASSESSMENT — ENCOUNTER SYMPTOMS
COUGH: 0
SORE THROAT: 0
SHORTNESS OF BREATH: 0
PARESTHESIAS: 0
MYALGIAS: 0
JOINT SWELLING: 0
NAUSEA: 0
FREQUENCY: 0
EYE PAIN: 0
WEAKNESS: 0
CONSTIPATION: 0
CHILLS: 0
ABDOMINAL PAIN: 0
HEMATURIA: 0
NERVOUS/ANXIOUS: 0
HEADACHES: 0
ARTHRALGIAS: 0
DIZZINESS: 0
DIARRHEA: 0
HEMATOCHEZIA: 0
PALPITATIONS: 0
HEARTBURN: 0
FEVER: 0
DYSURIA: 0

## 2022-09-08 ASSESSMENT — PAIN SCALES - GENERAL: PAINLEVEL: NO PAIN (0)

## 2022-09-08 NOTE — PROGRESS NOTES
SUBJECTIVE:   CC: Mark Gomez is an 46 year old male who presents for preventative health visit.       Patient has been advised of split billing requirements and indicates understanding: Yes  Healthy Habits:     Getting at least 3 servings of Calcium per day:  Yes    Bi-annual eye exam:  Yes    Dental care twice a year:  Yes    Sleep apnea or symptoms of sleep apnea:  Excessive snoring    Diet:  Regular (no restrictions)    Frequency of exercise:  2-3 days/week    Duration of exercise:  15-30 minutes    Taking medications regularly:  Yes    Medication side effects:  None    PHQ-2 Total Score: 0    Additional concerns today:  No      Wants apnea eval.  Tired, snoring.  Thinks he may have it  Obesity, working on wt loss          Today's PHQ-2 Score:   PHQ-2 (  [employer]) 2022   Q1: Little interest or pleasure in doing things 0   Q2: Feeling down, depressed or hopeless 0   PHQ-2 Score 0   PHQ-2 Total Score (12-17 Years)- Positive if 3 or more points; Administer PHQ-A if positive -   Q1: Little interest or pleasure in doing things Not at all   Q2: Feeling down, depressed or hopeless Not at all   PHQ-2 Score 0       Abuse: Current or Past(Physical, Sexual or Emotional)- No  Do you feel safe in your environment? Yes        Social History     Tobacco Use     Smoking status: Former Smoker     Packs/day: 0.50     Years: 20.00     Pack years: 10.00     Types: Cigarettes     Quit date: 2013     Years since quittin.2     Smokeless tobacco: Never Used   Substance Use Topics     Alcohol use: Yes     Comment: rarely     If you drink alcohol do you typically have >3 drinks per day or >7 drinks per week? No    Alcohol Use 2022   Prescreen: >3 drinks/day or >7 drinks/week? No   Prescreen: >3 drinks/day or >7 drinks/week? -       Last PSA: No results found for: PSA    Reviewed orders with patient. Reviewed health maintenance and updated orders accordingly - Yes      Reviewed and updated as needed this visit by  "clinical staff   Tobacco  Allergies  Meds   Med Hx  Surg Hx  Fam Hx  Soc Hx          Reviewed and updated as needed this visit by Provider                       Review of Systems   Constitutional: Negative for chills and fever.   HENT: Negative for congestion, ear pain, hearing loss and sore throat.    Eyes: Negative for pain and visual disturbance.   Respiratory: Negative for cough and shortness of breath.    Cardiovascular: Negative for chest pain, palpitations and peripheral edema.   Gastrointestinal: Negative for abdominal pain, constipation, diarrhea, heartburn, hematochezia and nausea.   Genitourinary: Negative for dysuria, frequency, genital sores, hematuria, impotence, penile discharge and urgency.   Musculoskeletal: Negative for arthralgias, joint swelling and myalgias.   Skin: Negative for rash.   Neurological: Negative for dizziness, weakness, headaches and paresthesias.   Psychiatric/Behavioral: Negative for mood changes. The patient is not nervous/anxious.          OBJECTIVE:   /81 (BP Location: Right arm, Patient Position: Sitting, Cuff Size: Adult Large)   Pulse 83   Temp 97.4  F (36.3  C) (Tympanic)   Resp 24   Ht 1.905 m (6' 3\")   Wt 122.3 kg (269 lb 9.6 oz)   SpO2 97%   BMI 33.70 kg/m      Physical Exam  Gen: alert and oriented, in no acute distress, affect within normal limits  Neck: supple with no masses or nodes  Throat: oropharynx clear, no exudate or tonsillar/palate asymmetry.    CV: RRR, no murmur  Lungs: clear bilaterally with good effort  Abd: nontender, no mass  Ext: no edema or lesions   Neuro: moving all extremities, gait normal, no focal deficts noted      ASSESSMENT/PLAN:   Well exam  Obesity  Excessive sleepiness, possible apnea    Sleep eval.  Reviewed labs from ED visit last month, no issues    Back in a year.   Tetanus.  Declines other shots.   Didn't want HIV or hep C.     Declines colon screening for now.  Will think more about it over next year.  " "        COUNSELING:   Reviewed preventive health counseling, as reflected in patient instructions       Regular exercise       Healthy diet/nutrition    Estimated body mass index is 33.7 kg/m  as calculated from the following:    Height as of this encounter: 1.905 m (6' 3\").    Weight as of this encounter: 122.3 kg (269 lb 9.6 oz).     Weight management plan: diet/exercise    He reports that he quit smoking about 9 years ago. His smoking use included cigarettes. He has a 10.00 pack-year smoking history. He has never used smokeless tobacco.      Counseling Resources:  ATP IV Guidelines  Pooled Cohorts Equation Calculator  FRAX Risk Assessment  ICSI Preventive Guidelines  Dietary Guidelines for Americans, 2010  USDA's MyPlate  ASA Prophylaxis  Lung CA Screening    Mahamed Martin MD  [facility]  "

## 2022-09-27 ENCOUNTER — TELEPHONE (OUTPATIENT)
Dept: FAMILY MEDICINE | Facility: CLINIC | Age: 47
End: 2022-09-27

## 2022-09-27 DIAGNOSIS — Z00.00 WELL ADULT EXAM: Primary | ICD-10-CM

## 2022-09-27 NOTE — TELEPHONE ENCOUNTER
Chong had a wellness visit on 09/08/22 and needs a biometric form filled out. He needs FLP and glucose ordered. I will send to Dr. Martin to sign. Biometric form in Dr. Martin's folder.    Karma daniel

## 2022-10-04 NOTE — TELEPHONE ENCOUNTER
Forms complete and patient notified with [employer] message on 9/28/22. Message was read by patient. Forms faxed as well

## 2022-10-06 ENCOUNTER — LAB (OUTPATIENT)
Dept: LAB | Facility: CLINIC | Age: 47
End: 2022-10-06
Payer: COMMERCIAL

## 2022-10-06 ENCOUNTER — MYC MEDICAL ADVICE (OUTPATIENT)
Dept: FAMILY MEDICINE | Facility: CLINIC | Age: 47
End: 2022-10-06

## 2022-10-06 DIAGNOSIS — Z00.00 WELL ADULT EXAM: ICD-10-CM

## 2022-10-06 LAB
CHOLEST SERPL-MCNC: 159 MG/DL
FASTING STATUS PATIENT QL REPORTED: YES
GLUCOSE SERPL-MCNC: 108 MG/DL (ref 70–99)
HDLC SERPL-MCNC: 51 MG/DL
LDLC SERPL CALC-MCNC: 88 MG/DL
NONHDLC SERPL-MCNC: 108 MG/DL
TRIGL SERPL-MCNC: 99 MG/DL

## 2022-10-06 PROCEDURE — 82947 ASSAY GLUCOSE BLOOD QUANT: CPT

## 2022-10-06 PROCEDURE — 36415 COLL VENOUS BLD VENIPUNCTURE: CPT

## 2022-10-06 PROCEDURE — 80061 LIPID PANEL: CPT

## 2022-10-07 ENCOUNTER — MYC MEDICAL ADVICE (OUTPATIENT)
Dept: FAMILY MEDICINE | Facility: CLINIC | Age: 47
End: 2022-10-07

## 2022-10-10 NOTE — TELEPHONE ENCOUNTER
Form completed and signed by Dr. Martin. Called and spoke with pt, would like the form faxed per directions on form. Pt did not want a copy. Faxed, sent to scanning.    Britt Berman Patient

## 2022-10-19 NOTE — TELEPHONE ENCOUNTER
MEDICAL RECORDS REQUEST   Page for Prostate & Urologic Cancers  [facility]  [address] [place] [address]  PHONE: [phone]  Fax: [phone]        FUTURE VISIT INFORMATION                                                   Mark Gomez, : 1975 scheduled for future visit at [facility]    APPOINTMENT INFORMATION:    Date: 2023    Provider:  Mark Cordero MD    Reason for Visit/Diagnosis: ERECTILE DYSFUNCTION    RECORDS REQUESTED FOR VISIT                                                     NOTES  STATUS/DETAILS   OFFICE NOTE from other specialist  yes, 10/03/2019 -- JUDD   MEDICATION LIST  no   LABS     URINALYSIS (UA)  yes   SEMEN ANALYSIS (LAST 2)  yes, 2019, 2019     PRE-VISIT CHECKLIST      Record collection complete Yes   Appointment appropriately scheduled           (right time/right provider) Yes   Joint diagnostic appointment coordinated correctly          (ensure right order & amount of time) Yes   MyChart activation Yes   Questionnaire complete If no, please explain PENDING

## 2022-11-17 ENCOUNTER — PRE VISIT (OUTPATIENT)
Dept: UROLOGY | Facility: CLINIC | Age: 47
End: 2022-11-17

## 2023-01-03 ASSESSMENT — SLEEP AND FATIGUE QUESTIONNAIRES
HOW LIKELY ARE YOU TO NOD OFF OR FALL ASLEEP WHILE SITTING AND READING: MODERATE CHANCE OF DOZING
HOW LIKELY ARE YOU TO NOD OFF OR FALL ASLEEP WHILE SITTING AND TALKING TO SOMEONE: WOULD NEVER DOZE
HOW LIKELY ARE YOU TO NOD OFF OR FALL ASLEEP WHILE SITTING QUIETLY AFTER LUNCH WITHOUT ALCOHOL: MODERATE CHANCE OF DOZING
HOW LIKELY ARE YOU TO NOD OFF OR FALL ASLEEP WHILE LYING DOWN TO REST IN THE AFTERNOON WHEN CIRCUMSTANCES PERMIT: MODERATE CHANCE OF DOZING
HOW LIKELY ARE YOU TO NOD OFF OR FALL ASLEEP IN A CAR, WHILE STOPPED FOR A FEW MINUTES IN TRAFFIC: WOULD NEVER DOZE
HOW LIKELY ARE YOU TO NOD OFF OR FALL ASLEEP WHILE WATCHING TV: MODERATE CHANCE OF DOZING
HOW LIKELY ARE YOU TO NOD OFF OR FALL ASLEEP WHEN YOU ARE A PASSENGER IN A CAR FOR AN HOUR WITHOUT A BREAK: MODERATE CHANCE OF DOZING
HOW LIKELY ARE YOU TO NOD OFF OR FALL ASLEEP WHILE SITTING INACTIVE IN A PUBLIC PLACE: MODERATE CHANCE OF DOZING

## 2023-01-04 ENCOUNTER — VIRTUAL VISIT (OUTPATIENT)
Dept: SLEEP MEDICINE | Facility: CLINIC | Age: 48
End: 2023-01-04
Attending: FAMILY MEDICINE
Payer: COMMERCIAL

## 2023-01-04 ENCOUNTER — TELEPHONE (OUTPATIENT)
Dept: SLEEP MEDICINE | Facility: CLINIC | Age: 48
End: 2023-01-04

## 2023-01-04 VITALS — HEIGHT: 75 IN | BODY MASS INDEX: 32.95 KG/M2 | WEIGHT: 265 LBS

## 2023-01-04 DIAGNOSIS — R06.89 DYSPNEA AND RESPIRATORY ABNORMALITY: Primary | ICD-10-CM

## 2023-01-04 DIAGNOSIS — Z72.820 LACK OF ADEQUATE SLEEP: ICD-10-CM

## 2023-01-04 DIAGNOSIS — R06.00 DYSPNEA AND RESPIRATORY ABNORMALITY: Primary | ICD-10-CM

## 2023-01-04 DIAGNOSIS — G47.30 SLEEP APNEA, UNSPECIFIED TYPE: ICD-10-CM

## 2023-01-04 DIAGNOSIS — G47.10 EXCESSIVE SLEEPINESS: ICD-10-CM

## 2023-01-04 DIAGNOSIS — E66.811 CLASS 1 OBESITY DUE TO EXCESS CALORIES WITH BODY MASS INDEX (BMI) OF 33.0 TO 33.9 IN ADULT, UNSPECIFIED WHETHER SERIOUS COMORBIDITY PRESENT: ICD-10-CM

## 2023-01-04 DIAGNOSIS — E66.09 CLASS 1 OBESITY DUE TO EXCESS CALORIES WITH BODY MASS INDEX (BMI) OF 33.0 TO 33.9 IN ADULT, UNSPECIFIED WHETHER SERIOUS COMORBIDITY PRESENT: ICD-10-CM

## 2023-01-04 PROCEDURE — 99244 OFF/OP CNSLTJ NEW/EST MOD 40: CPT | Mod: 95 | Performed by: PHYSICIAN ASSISTANT

## 2023-01-04 NOTE — PATIENT INSTRUCTIONS
MY CONTACT NUMBERS ARE: [phone]  DOCTOR : CHIARA Tanner  SLEEP CENTER :   CPAP EQUIPMENT :    IF I HAVE SLEEP APNEA.....  WHERE CAN I FIND MORE INFORMATION?    American Academy [employer] Patient information on sleep disorders:  http://yoursleep.aasmnet.org    THINGS TO REMEMBER  In most situations, sleep apnea is a lifelong disease that must be managed with daily therapy. Untreated disease, when severe, may result in an increased risk for an array of problems from heart disease to mood changes, car accidents and shorter lifespan.    CPAP -  WHY AND HOW?  Continuous positive airway pressure, or CPAP, is the most effective treatment for obstructive sleep apnea. A decision to use CPAP is a major step forward in the pursuit of a healthier life. The successful use of CPAP will help you breathe easier, sleep better and live healthier. Using CPAP can be a positive experience if you keep these langston points in mind:  Commitment  CPAP is not a quick fix for your problem. It involves a long-term commitment to improve your sleep and your health.    Communication  Stay in close communication with both your sleep doctor and your CPAP supplier. Ask lots of questions and seek help when you need it.    Consistency  Use CPAP all night, every night and for every nap. You will receive the maximum health benefits from CPAP when you use it every time that you sleep. This will also make it easier for your body to adjust to the treatment.    Correction  The first machine and mask that you try may not be the best ones for you. Work with your sleep doctor and your CPAP supplier to make corrections to your equipment selection. Ask about trying a different type of machine or mask if you have ongoing problems. Make sure that your mask is a good fit and learn to use your equipment properly.    Challenge  Tell a family member or close friend to ask you each morning if you used your CPAP the previous night. Have someone to  "challenge you to give it your best effort.    Connection   Your adjustment to CPAP will be easier if you are able to connect with others who use the same treatment. Ask your sleep doctor if there is a support group in your area for people who have sleep apnea, or look for one on the Internet.    Comfort   Increase your level of comfort by using a saline spray, decongestant or heated humidifier if CPAP irritates your nose, mouth or throat. Use your unit's \"ramp\" setting to slowly get used to the air pressure level. There may be soft pads you can buy that will fit over your mask straps. Look on www.CPAP.com for accessories such as these straps, a pillow contoured for side-sleeping with CPAP, longer hoses, hose covers to reduce condensation, or stands to keep the hose out of your way.                                 Cleaning   Clean your mask, tubing and headgear on a regular basis. Put this time in your schedule so that you don't forget to do it. Check and replace the filters for your CPAP unit and humidifier.    Completion   Although you are never finished with CPAP therapy, you should reward yourself by celebrating the completion of your first month of treatment. Expect this first month to be your hardest period of adjustment. It will involve some trial and error as you find the machine, mask and pressure settings that are right for you.    Continuation  After your first month of treatment, continue to make a daily commitment to use your CPAP all night, every night and for every nap.    CPAP-Tips to starting with success:  Begin using your CPAP for short periods of time during the day while you watch TV or read.    Use CPAP every night and for every nap. Using it less often reduces the health benefits and makes it harder for your body to get used to it.    Newer CPAP models are virtually silent; however, if you find the sound of your CPAP machine to be bothersome, place the unit under your bed to dampen the sound. "     Make small adjustments to your mask, tubing, straps and headgear until you get the right fit. Tightening the mask may actually worsen the leak.  If it leaves significant marks on your face or irritates the bridge of your nose, it may not be the best mask for you.  Speak with the person who supplied the mask and consider trying other masks.    Use a saline nasal spray to ease mild nasal congestion. Neti-Pot or saline nasal rinses may also help. Nasal gel sprays can help reduce nasal dryness.  Biotene mouthwash can be helpful to protect your teeth if you experience frequent dry mouth.  Dry mouth may be a sign of air escaping out of your mouth or out of the mask in the case of a full face mask.  Speak with your provider if you expect that is the case.     Take a nasal decongestant to relieve more severe nasal or sinus congestion.  Do not use Afrin (oxymetazoline) nasal spray more than 3 days in a row.  Speak with your sleep doctor if your nasal congestion is chronic.    Use a heated humidifier that fits your CPAP model to enhance your breathing comfort. Adjust the heat setting up if you get a dry nose or throat, down if you get condensation in the hose or mask.  Position the CPAP lower than you so that any condensation in the hose drains back into the machine rather than towards the mask.    Try a system that uses nasal pillows if traditional masks give you problems.    Clean your mask, tubing and headgear once a week. Make sure the equipment dries fully.    Regularly check and replace the filters for your CPAP unit and humidifier.    Work closely with your sleep doctor and your CPAP supplier to make sure that you have the machine, mask and air pressure setting that works best for you.    BESIDES CPAP, WHAT OTHER THERAPIES ARE THERE?  Postioning devices if you only have the problem on your back  Dental devices if your condition is mild  Nasal valves may be effective though experience is limited  Tongue Retaining  Device if missing teeth precludes the use of a dental device  Weight loss if you are overweight  Surgery in limited cases where devices are not acceptable or there are problems with structures in the nose and throat  If treated with one of these alternative options, further evaluation is necessary to ensure that the therapy is effective. This may require some form of testing.     Healthy Lifestyle:  Healthy diet, exercise and Limit alcohol: Not only will excessive alcohol increase your weight over time, but it irritates the throat tissues and make them swell, shrinking the airway and causing snoring. Drinking alcohol should be limited and stopped within 3-4 hours before going to bed.   Stop smoking: (Red swollen throat, heat, nicotine), also irritates and swells the airway, among numerous other negative health consequences.    Positioning Device  This example shows a pillow that straps around the waist. It may be appropriate for those whose sleep study shows milder sleep apnea that occurs primarily when lying flat on one's back. Preliminary studies have shown benefit but effectiveness at home should be verified.    Nasal Valves              Nasal valves may not be effective if you have frequent nasal congestion or have difficulty breathing through your nose. They may be an option for mild apnea if other options are not well tolerated. The efficacy of these devices is generally less than CPAP or oral appliances.  Oral Appliance  These are examples of two of many custom-made devices that are more likely to work in mild sleep apnea  Oral appliances are dental mouth pieces that fit very much like a sports mouth guards or removable orthodontic retainers. They are used to treat snoring  And obstructive sleep apnea . The device prevents the airway from collapsing by either holding the tongue or supporting the jaw in a forward position. Since oral appliances are non-invasive and easy to use, they may be considered as an  early treatment option. Oral appliance therapy (OAT) involves the customization, selection, fabrication, fitting, adjustments and long-term follow-up care of specially designed oral devices, worn during sleep, which reposition the lower jaw and tongue base forward to maintain an open airway.  Custom made oral appliances are proven to be more effective than over-the-counter devices. Therefore, the over-the-counter devices are recommended not to be used as a screening tool nor as a therapeutic option.  Who gets a dental device?  Oral appliance therapy can be used as an alternative to  CPAP therapy for the treatment of mild to moderate sleep apnea and for those patients who prefer OAT to CPAP. Oral appliance therapy is a first line therapy for the treatment of primary snoring. Additionally, OAT is an option for those that cannot tolerate CPAP as therapy or who have experienced insufficient surgical results.    Possible side effects?  Frequent but minor side effects include: excessive salivation, dry mouth, discomfort of teeth and jaw and temporary changes in the patient s bite.  Potential complications include: jaw pain, permanent occlusal changes and TMJ symptoms.  The above mentioned side effects and complications can be recognized and managed by dentists trained in dental sleep medicine.    Finding a dentist that practices dental sleep medicine  Specific training is available through the American Academy of Dental Sleep Medicine for dentists interested in working in the field of sleep. To find a dentist who is educated in the field of sleep and the use of oral appliances, near you, visit the Web site of the American Academy of Dental Sleep Medicine; also see http://www.accpstorage.org/newOrganization/patients/oralAppliances.pdf   To search for a dentist certified in these practices:  Http://aadsm.org/FindADentist.aspx?[email or web address]  [email or web address]    Tongue Retaining  Device               Tongue Retaining Devices are devices that generally  suction cup  onto the tongue preventing it from falling back into the back of the throat during sleep.  They may be an option for people missing teeth, but can be uncomfortable. This particular device can be purchased online, but similar devices made by dentists fit more precisely and may be tolerated better. In general, they are rarely effective and often not very well tolerated.    Weight Loss:  Some patients may experience reduction or elimination of sleep apnea with weight loss.  Though there are significant health benefits from weight loss, long-term weight loss is very difficult to achieve- studies show success with dietary management in less that 10% of people.     If you are interested in dietary weight loss, you should review the options discussed at the National Institutes of Health patient information site:     Http:/www.health.nih.gov/topic/WeightLossDieting    Bariatric programs offer counseling in all methods of weight loss:    Http:/www.[facility].[email or web address]/WeightLossSurgeryandMedicalMgmt/htm    Surgery:  There are a number of surgeries that have been attempted to treat apnea. In general, surgical options are usually reserved for cases in which there is a physical abnormality contributing to obstruction or other treatment options are ineffective or not tolerated. Most surgical options are either unreliable or quite invasive. One of the more common procedures is:  Uvulopalatopharyngoplasty: In this procedure, the uvula (the finger-like tissue that hangs in the back of the throat), part of the soft palate (the tissue that the uvula is attached to), and sometimes the tonsils or adenoids are removed. The efficacy of this surgery is around 30-50% .  After surgery, complications may include:  Sleepiness and sleep apnea related to post-surgery medication   Swelling, infection and bleeding   A sore throat and/or difficulty  "swallowing   Drainage of secretions into the nose and a nasal quality to the voice. English language speech does not seem to be affected by this surgery.   Narrowing of the airway in the nose and throat (hence constricting breathing) snoring and even iatrogenically caused sleep apnea. By cutting the tissues, excess scar tissue can \"tighten\" the airway and make it even smaller than it was before UPPP.  Patients who have had the uvula removed will become unable to correctly speak Upper sorbian or any other language that has a uvular 'r' phoneme.    Surgeries to help resolve nasal congestion may help reduce the severity of apnea slightly. Nasal congestion does not cause apnea on its own, so these surgeries are usually not performed just for JEANINE.  They may be worth considering if the nasal congestion is significantly bothersome independent of apnea.   "

## 2023-01-04 NOTE — PROGRESS NOTES
Chong is a 47 year old who is being evaluated via a billable video visit.      How would you like to obtain your AVS? MyChart  If the video visit is dropped, the invitation should be resent by: Text to cell phone: [phone]  Will anyone else be joining your video visit? No        Video-Visit Details    Type of service:  Video Visit     Video start time: 8:01 AM  Video end time:8:26 AM    Originating Location (pt. Location): Home    Distant Location (provider location):  On-site  Platform used for Video Visit: Chelsey Leal      Outpatient Sleep Medicine Consultation:      Name: Mark Gomez MRN# [id number]   Age: 47 year old YOB: 1975     Date of Consultation: January 4, 2023  Consultation is requested by: Mahamed Martin MD  [address] [place] [id number] Mahamed Martin  Primary care provider: Mahamed Martin       Reason for Sleep Consult:     Mark Gomez is sent by Mahamed Martin for a sleep consultation regarding snoring and excessive daytime sleepiness.    Patient s Reason for visit  Mark Gomez main reason for visit: Excessive snoring.  Sleep apnea.  Patient states problem(s) started: I've always snored since I can remember.  Mark Gomez's goals for this visit: How bad is my condition?  Get a prescription for a CPAP if needed.           Assessment and Plan:     Summary Sleep Diagnoses & Recommendations:  Patient has features and risk factors for possible obstructive sleep apnea including: loud snoring, witnessed apnea, bruxism, excessive daytime sleepiness (ESS 12), crowded oropharynx and large neck circumference. The STOP-BANG score is 5/8.  The pathophysiology, diagnosis and treatment of JEANINE was discussed. Will schedule a Home Sleep Apnea Testing to evaluate for obstructive sleep apnea.    We discussed the link between obesity, sleep apnea, and health outcomes.  Weight loss is recommended to address long term effects of obesity and sleep apnea.     Summary  Recommendations:    No orders of the defined types were placed in this encounter.        Summary Counseling:    Sleep Testing Reviewed  Obstructive Sleep Apnea Reviewed  Complications of Untreated Sleep Apnea Reviewed      Medical Decision-making:   Educational materials provided in instructions    Total time spent reviewing medical records, history and physical examination, review of previous testing and interpretation as well as documentation on this date:50 minutes    CC: Mahamed Martin          History of Present Illness:       SLEEP-WAKE SCHEDULE:     Work/School Days: Patient goes to school/work: Yes   Usually gets into bed at 10:30pm  Takes patient about 2 minutes to fall asleep  Has trouble falling asleep 0 nights per week  Wakes up in the middle of the night 1 times.  Wakes up due to External stimuli (bed partner, pets, noise, etc);Use the bathroom  He has trouble falling back asleep 0 times a week.   It usually takes 2 minutes to get back to sleep  Patient is usually up at 6am  Uses alarm: Yes    Weekends/Non-work Days/All Other Days:  Usually gets into bed at 11:30pm   Takes patient about 2 minutes to fall asleep  Patient is usually up at 8am  Uses alarm: No    Sleep Need  Patient gets  7 hours sleep on average.    Patient thinks he needs about 7 hours sleep    Mark Gomez prefers to sleep in this position(s): Back;Side;Stomach   Patient states they do the following activities in bed: Watch TV;Use phone, computer, or tablet    Naps  Patient takes a purposeful nap 1 times a week and naps are usually 30 minutes in duration  He feels better after a nap: Yes  He dozes off unintentionally 5 days per week  Patient has had a driving accident or near-miss due to sleepiness/drowsiness: No      SLEEP DISRUPTIONS:    Breathing/Snoring  Patient snores:Yes  Other people complain about his snoring: Yes  Patient has been told he stops breathing in his sleep:Yes  He has issues with the following: Morning mouth  dryness    Movement:  Patient gets pain, discomfort, with an urge to move:  No  It happens when he is resting:  No  It happens more at night:  No  Patient has been told he kicks his legs at night:  No     Behaviors in Sleep:  Mark Gomez has experienced the following behaviors while sleeping:    He has experienced sudden muscle weakness during the day: No      Is there anything else you would like your sleep provider to know:        CAFFEINE AND OTHER SUBSTANCES:    Patient consumes caffeinated beverages per day:  2  Last caffeine use is usually: Noon  List of any prescribed or over the counter stimulants that patient takes:    List of any prescribed or over the counter sleep medication patient takes:    List of previous sleep medications that patient has tried:    Patient drinks alcohol to help them sleep: No  Patient drinks alcohol near bedtime: No    Family History:  Patient has a family member been diagnosed with a sleep disorder: No            SCALES:    EPWORTH SLEEPINESS SCALE      Hinkle Sleepiness Scale ( BRITTNEY Baum  [id number]<br>ESS - [place]/English - Final version - 21 Nov 07 - [facility] [employer].) 1/3/2023   Sitting and reading Moderate chance of dozing   Watching TV Moderate chance of dozing   Sitting, inactive in a public place (e.g. a theatre or a meeting) Moderate chance of dozing   As a passenger in a car for an hour without a break Moderate chance of dozing   Lying down to rest in the afternoon when circumstances permit Moderate chance of dozing   Sitting and talking to someone Would never doze   Sitting quietly after a lunch without alcohol Moderate chance of dozing   In a car, while stopped for a few minutes in traffic Would never doze   Hinkle Score (MC) 12   Hinkle Score (Sleep) 12         INSOMNIA SEVERITY INDEX (JACE)      Insomnia Severity Index (JACE) 1/3/2023   Difficulty falling asleep 0   Difficulty staying asleep 0   Problems waking up too early 0   How SATISFIED/DISSATISFIED  "are you with your CURRENT sleep pattern? 2   How NOTICEABLE to others do you think your sleep problem is in terms of impairing the quality of your life? 4   How WORRIED/DISTRESSED are you about your current sleep problem? 2   To what extent do you consider your sleep problem to INTERFERE with your daily functioning (e.g. daytime fatigue, mood, ability to function at work/daily chores, concentration, memory, mood, etc.) CURRENTLY? 2   JACE Total Score 10       Guidelines for Scoring/Interpretation:  Total score categories:  0-7 = No clinically significant insomnia   8-14 = Subthreshold insomnia   15-21 = Clinical insomnia (moderate severity)  22-28 = Clinical insomnia (severe)  Used via courtesy of www.Adea.va.gov with permission from Teo Silva PhD., [facility]      STOP BANG     STOP BANG Questionnaire (  2008, the American Society of Anesthesiologists, Inc. [employer] 1/4/2023   1. Snoring - Do you snore loudly (louder than talking or loud enough to be heard through closed doors)? -   2. Tired - Do you often feel tired, fatigued, or sleepy during daytime? -   3. Observed - Has anyone observed you stop breathing during your sleep? -   4. Blood pressure - Do you have or are you being treated for high blood pressure? -   5. BMI - BMI more than 35 kg/m2? -   6. Age - Age over 50 yr old? -   7. Neck circumference - Neck circumference greater than 40 cm? -   8. Gender - Gender male? -   STOP BANG Score (MC): -   B/P Clinic: (No Data)   BMI Clinic: 33.12         GAD7    No flowsheet data found.      CAGE-AID    No flowsheet data found.    CAGE-AID reprinted with permission from the [place] Medical Journal, DEEPAK Burns. and LUCIANA Diggs, \"Conjoint screening questionnaires for alcohol and drug abuse\" [place] Medical Journal 94: 135-140, 1995.      PATIENT HEALTH QUESTIONNAIRE-9 (PHQ - 9)    PHQ-9 ([employer]) 10/3/2019   1.  Little interest or pleasure in doing things 0   2.  Feeling " down, depressed, or hopeless 0   3.  Trouble falling or staying asleep, or sleeping too much 0   4.  Feeling tired or having little energy 0   5.  Poor appetite or overeating 0   6.  Feeling bad about yourself 0   7.  Trouble concentrating 0   8.  Moving slowly or restless 0   9.  Suicidal or self-harm thoughts 0   PHQ-9 Total Score 0   1.  Little interest or pleasure in doing things Not at all   2.  Feeling down, depressed, or hopeless Not at all   3.  Trouble falling or staying asleep, or sleeping too much Not at all   4.  Feeling tired or having little energy Not at all   5.  Poor appetite or overeating Not at all   6.  Feeling bad about yourself Not at all   7.  Trouble concentrating Not at all   8.  Moving slowly or restless Not at all   9.  Suicidal or self-harm thoughts Not at all   PHQ-9 via [facility] TOTAL SCORE-----> 0   Difficulty at work, home, or with people Not difficult at all       Developed by Amparo Vivar, Leny Lobo, Chirag Manriquez and colleagues, with an educational krysten from [employer] No permission required to reproduce, translate, display or distribute.        Allergies:    No Known Allergies    Medications:    No current outpatient medications on file.       Problem List:  Patient Active Problem List    Diagnosis Date Noted     Excessive sleepiness 09/08/2022     Priority: Medium     Chronic low back pain 02/27/2012     Priority: Medium     Pudendal nerve block 02-21-12- [facility].  [facility] [phone]       CARDIOVASCULAR SCREENING; LDL GOAL LESS THAN 160 10/31/2010     Priority: Medium        Past Medical/Surgical History:  Past Medical History:   Diagnosis Date     NO ACTIVE PROBLEMS      Past Surgical History:   Procedure Laterality Date     SURGICAL HISTORY OF -   child     strabismus      SURGICAL HISTORY OF -   age 13     right wrist fracture        Social History:  Social History     Socioeconomic History     Marital status:      Spouse name: Not  on file     Number of children: Not on file     Years of education: Not on file     Highest education level: Not on file   Occupational History     Not on file   Tobacco Use     Smoking status: Former     Packs/day: 0.50     Years: 20.00     Pack years: 10.00     Types: Cigarettes     Quit date: 2013     Years since quittin.6     Smokeless tobacco: Never   Vaping Use     Vaping Use: Never used   Substance and Sexual Activity     Alcohol use: Yes     Comment: rarely     Drug use: No     Sexual activity: Yes     Partners: Female   Other Topics Concern     Parent/sibling w/ CABG, MI or angioplasty before 65F 55M? No      Service No     Blood Transfusions No     Caffeine Concern Yes     Comment: 2 cup/coffee/day.        Occupational Exposure Not Asked     Hobby Hazards Not Asked     Sleep Concern No     Stress Concern No     Weight Concern Not Asked     Special Diet Not Asked     Back Care Not Asked     Exercise Not Asked     Bike Helmet Not Asked     Seat Belt No     Self-Exams Not Asked   Social History Narrative     Not on file     Social Determinants of Health     Financial Resource Strain: Not on file   Food Insecurity: Not on file   Transportation Needs: Not on file   Physical Activity: Not on file   Stress: Not on file   Social Connections: Not on file   Intimate Partner Violence: Not on file   Housing Stability: Not on file       Family History:  No family history on file.    Review of Systems:  A complete review of systems reviewed by me is negative with the exeption of what has been mentioned in the history of present illness.  In the last TWO WEEKS have you experienced any of the following symptoms?  Fevers: No  Night Sweats: No  Weight Gain: No  Pain at Night: No  Double Vision: No  Changes in Vision: No  Difficulty Breathing through Nose: No  Sore Throat in Morning: Yes  Dry Mouth in the Morning: Yes  Shortness of Breath Lying Flat: No  Shortness of Breath With Activity: No  Awakening with  "Shortness of Breath: No  Increased Cough: No  Heart Racing at Night: No  Swelling in Feet or Legs: No  Diarrhea at Night: No  Heartburn at Night: No  Urinating More than Once at Night: No  Losing Control of Urine at Night: No  Joint Pains at Night: No  Headaches in Morning: No  Weakness in Arms or Legs: No  Depressed Mood: No  Anxiety: No     Physical Examination:  Vitals: Ht 1.905 m (6' 3\")   Wt 120.2 kg (265 lb)   BMI 33.12 kg/m    BMI= Body mass index is 33.12 kg/m .     Neck Size of 17 inches      GENERAL APPEARANCE: alert and no distress  EYES: Eyes grossly normal to inspection  HENT: oropharynx crowded  NECK: no asymmetry, masses, or scars  RESP: breathing is non-labored  NEURO: mentation intact and speech normal  PSYCH: affect normal/bright  Mallampati Class:          Data: All pertinent previous laboratory data reviewed     Recent Labs   Lab Test 10/06/22  0736 08/21/22  1123 03/03/21  0808 11/24/17  0854   NA  --  140  --  139   POTASSIUM  --  4.1  --  3.8   CHLORIDE  --  107  --  104   CO2  --  28  --  29   ANIONGAP  --  5  --  6   * 98   < > 103*   BUN  --  14  --  12   CR  --  0.90  --  0.96   WILLARD  --  8.8  --  8.6    < > = values in this interval not displayed.       Recent Labs   Lab Test 08/21/22  1123   WBC 8.7   RBC 4.85   HGB 15.3   HCT 45.5   MCV 94   MCH 31.5   MCHC 33.6   RDW 12.3          Recent Labs   Lab Test 08/21/22  1123   PROTTOTAL 7.4   ALBUMIN 3.9   BILITOTAL 0.7   ALKPHOS 52   AST 35   ALT 49       TSH (mU/L)   Date Value   01/27/2014 1.89   09/21/2010 1.25       CHIARA Tanner-C1/4/2023         "

## 2023-01-12 ENCOUNTER — OFFICE VISIT (OUTPATIENT)
Dept: UROLOGY | Facility: CLINIC | Age: 48
End: 2023-01-12
Payer: COMMERCIAL

## 2023-01-12 VITALS
DIASTOLIC BLOOD PRESSURE: 89 MMHG | HEIGHT: 75 IN | SYSTOLIC BLOOD PRESSURE: 137 MMHG | BODY MASS INDEX: 32.33 KG/M2 | HEART RATE: 82 BPM | WEIGHT: 260 LBS

## 2023-01-12 DIAGNOSIS — N52.8 OTHER MALE ERECTILE DYSFUNCTION: Primary | ICD-10-CM

## 2023-01-12 PROCEDURE — 99204 OFFICE O/P NEW MOD 45 MIN: CPT | Performed by: UROLOGY

## 2023-01-12 RX ORDER — TADALAFIL 20 MG/1
20 TABLET ORAL DAILY PRN
Qty: 30 TABLET | Refills: 11 | Status: SHIPPED | OUTPATIENT
Start: 2023-01-12 | End: 2023-01-12

## 2023-01-12 RX ORDER — TADALAFIL 20 MG/1
20 TABLET ORAL DAILY PRN
Qty: 30 TABLET | Refills: 11 | Status: SHIPPED | OUTPATIENT
Start: 2023-01-12 | End: 2023-04-10

## 2023-01-12 NOTE — LETTER
2023       RE: Mark Gomez  [address] [facility] [address]     Dear Colleague,    Thank you for referring your patient, Mark Gomez, to the [facility] [place] at [facility]. Please see a copy of my visit note below.    I am seeing Mark Gomez in consultation for evaluation of erectile dysfunction.    HPI:  Mark Gomez is a 47 year old male is a very nice man with complaints of erectile dysfunction.    ED/Vascular disease risk factors:  HTN:   No  Hyperlipidemia: no  Smoking: former   DM: no  Cardiovascular disease: None  known  Meds associated with ED that he's taking: none  Anxiety/anger/depression:  No  Penile Plaques or curvature: none.     I saw him in past for fertility.  Semen analysis results were normal.  They would like one more child.    He has trouble maintaining erections.    REVIEW OF SYSTEMS:  General: negative  Skin: negative  Eyes: negative  Ears/Nose/Throat: negative  Respiratory: negative  Cardiovascular: negative  Gastrointestinal: negative  Genitourinary: see HPI  Musculoskeletal: negative  Neurologic: negative  Psychiatric: negative  Hematologic/Lymphatic/Immunologic: negative  Endocrine: negative    PAST MEDICAL HX:  Past Medical History:   Diagnosis Date     NO ACTIVE PROBLEMS        PAST SURG HX:  Past Surgical History:   Procedure Laterality Date     SURGICAL HISTORY OF -   child     strabismus      SURGICAL HISTORY OF -   age 13     right wrist fracture         FAMILY HX:  No family history on file.    SOCIAL HX:  Social History     Tobacco Use     Smoking status: Former     Packs/day: 0.50     Years: 20.00     Pack years: 10.00     Types: Cigarettes     Quit date: 2013     Years since quittin.6     Smokeless tobacco: Never   Vaping Use     Vaping Use: Never used   Substance Use Topics     Alcohol use: Yes     Comment: rarely     Drug use: No       MEDICATIONS:  No prescription  "medications     ALLERGIES:  Patient has no known allergies.      GENERAL PHYSICAL EXAM:   /89   Pulse 82   Ht 1.905 m (6' 3\")   Wt 117.9 kg (260 lb)   BMI 32.50 kg/m      General: Alert, oriented, nad  Eyes: anicteric, EOMI.  Pulse: regular  Resps: normal, non-labored.  Abdomen:  nondistended.   exam deferred.     Imaging/labs:    Component      Latest Ref Rng & Units 10/3/2019 12/12/2019   Collection Method        Masturbation   Collection Site        CELESTE   Specimen Type        Semen   Lab Receipt Time        07:10 AM   Time of Analysis        07:30 AM   Analysis Temp - Centigrade      centigrade  22   Abstinence days      2 - 7 days  7   Liquefied      yes/no  Yes   Viscous      yes/no  No   Agglutination      yes/no  No   pH      7.2 pH  7.6   Volume      1.5 ml  4.0   Concentration      15 million/ml  197   Total Number      39 million  788   Progressive motility      32 %  55   Non-progressive motility      %  6   Immotile      %  39   Total Progressive Motile      15.6 million  433   Vitality      58 %  .   Normal Sperm      4 % normal forms  5   Abnormal Sperm      morphology  95   Head Defect        96   Midpiece Defect        62   Tail Defect        7   Round Cells      2 million/ml  0.4   WBC      %  .   Immature Sperm      %  .   Cell Fragments      %  .   Consent to Release to Partner        Yes   Testosterone Total      240 - 950 ng/dL 399    Sex Hormone Binding Globulin      11 - 80 nmol/L 46    Free Testosterone Calculated      4.7 - 24.4 ng/dL 6.54    FSH      0.7 - 10.8 IU/L 3.2    Estradiol Ultrasensitive      10 - 40 pg/mL 21        ASSESSMENT:     Erectile dysfunction, trouble maintaining.    Normal semen analysis results     Normal testosterone, normal FSH, Testosterone to estrogen ratio is normal oct 2019    PLAN:    Tadalafil prescription.  Discussed side effects and how to take.    Prolactin, testosterone, LH levels ordered, future blood draw.    Mark Cordero MD   Urological " Surgeon     Additional Coding Information:    Problems:  3 -- one stable chronic illness    Data Reviewed  3 or more studies reviewed, as listed above     Tests ordered/pending: 3 labs ordered     Notes from other providers reviewed: N/A     Level of risk:  4 -- prescription drug management    Time spent:  15 minutes spent on the date of the encounter doing chart review, history and exam, documentation and further activities per the note

## 2023-01-12 NOTE — PROGRESS NOTES
"I am seeing Mark Gomez in consultation for evaluation of erectile dysfunction.    HPI:  Mark Gomez is a 47 year old male is a very nice man with complaints of erectile dysfunction.    ED/Vascular disease risk factors:  HTN:   No  Hyperlipidemia: no  Smoking: former   DM: no  Cardiovascular disease: None  known  Meds associated with ED that he's taking: none  Anxiety/anger/depression:  No  Penile Plaques or curvature: none.     I saw him in past for fertility.  Semen analysis results were normal.  They would like one more child.    He has trouble maintaining erections.    REVIEW OF SYSTEMS:  General: negative  Skin: negative  Eyes: negative  Ears/Nose/Throat: negative  Respiratory: negative  Cardiovascular: negative  Gastrointestinal: negative  Genitourinary: see HPI  Musculoskeletal: negative  Neurologic: negative  Psychiatric: negative  Hematologic/Lymphatic/Immunologic: negative  Endocrine: negative    PAST MEDICAL HX:  Past Medical History:   Diagnosis Date     NO ACTIVE PROBLEMS        PAST SURG HX:  Past Surgical History:   Procedure Laterality Date     SURGICAL HISTORY OF -   child     strabismus      SURGICAL HISTORY OF -   age 13     right wrist fracture         FAMILY HX:  No family history on file.    SOCIAL HX:  Social History     Tobacco Use     Smoking status: Former     Packs/day: 0.50     Years: 20.00     Pack years: 10.00     Types: Cigarettes     Quit date: 2013     Years since quittin.6     Smokeless tobacco: Never   Vaping Use     Vaping Use: Never used   Substance Use Topics     Alcohol use: Yes     Comment: rarely     Drug use: No       MEDICATIONS:  No prescription medications     ALLERGIES:  Patient has no known allergies.      GENERAL PHYSICAL EXAM:   /89   Pulse 82   Ht 1.905 m (6' 3\")   Wt 117.9 kg (260 lb)   BMI 32.50 kg/m      General: Alert, oriented, nad  Eyes: anicteric, EOMI.  Pulse: regular  Resps: normal, non-labored.  Abdomen:  nondistended.   exam " deferred.     Imaging/labs:    Component      Latest Ref Rng & Units 10/3/2019 12/12/2019   Collection Method        Masturbation   Collection Site        CELESTE   Specimen Type        Semen   Lab Receipt Time        07:10 AM   Time of Analysis        07:30 AM   Analysis Temp - Centigrade      centigrade  22   Abstinence days      2 - 7 days  7   Liquefied      yes/no  Yes   Viscous      yes/no  No   Agglutination      yes/no  No   pH      7.2 pH  7.6   Volume      1.5 ml  4.0   Concentration      15 million/ml  197   Total Number      39 million  788   Progressive motility      32 %  55   Non-progressive motility      %  6   Immotile      %  39   Total Progressive Motile      15.6 million  433   Vitality      58 %  .   Normal Sperm      4 % normal forms  5   Abnormal Sperm      morphology  95   Head Defect        96   Midpiece Defect        62   Tail Defect        7   Round Cells      2 million/ml  0.4   WBC      %  .   Immature Sperm      %  .   Cell Fragments      %  .   Consent to Release to Partner        Yes   Testosterone Total      240 - 950 ng/dL 399    Sex Hormone Binding Globulin      11 - 80 nmol/L 46    Free Testosterone Calculated      4.7 - 24.4 ng/dL 6.54    FSH      0.7 - 10.8 IU/L 3.2    Estradiol Ultrasensitive      10 - 40 pg/mL 21          ASSESSMENT:     Erectile dysfunction, trouble maintaining.    Normal semen analysis results     Normal testosterone, normal FSH, Testosterone to estrogen ratio is normal oct 2019    PLAN:    Tadalafil prescription.  Discussed side effects and how to take.    Prolactin, testosterone, LH levels ordered, future blood draw.    Mark Cordero MD   Urological Surgeon     Additional Coding Information:    Problems:  3 -- one stable chronic illness    Data Reviewed  3 or more studies reviewed, as listed above     Tests ordered/pending: 3 labs ordered     Notes from other providers reviewed: N/A     Level of risk:  4 -- prescription drug management    Time spent:  15  minutes spent on the date of the encounter doing chart review, history and exam, documentation and further activities per the note

## 2023-01-12 NOTE — NURSING NOTE
"Chief Complaint   Patient presents with     Erectile Dysfunction       Height 1.905 m (6' 3\"), weight 117.9 kg (260 lb). Body mass index is 32.5 kg/m .    Patient Active Problem List   Diagnosis     CARDIOVASCULAR SCREENING; LDL GOAL LESS THAN 160     Chronic low back pain     Excessive sleepiness     Class 1 obesity due to excess calories with body mass index (BMI) of 33.0 to 33.9 in adult, unspecified whether serious comorbidity present       No Known Allergies    No current outpatient medications on file.       Social History     Tobacco Use     Smoking status: Former     Packs/day: 0.50     Years: 20.00     Pack years: 10.00     Types: Cigarettes     Quit date: 2013     Years since quittin.6     Smokeless tobacco: Never   Vaping Use     Vaping Use: Never used   Substance Use Topics     Alcohol use: Yes     Comment: rarely     Drug use: No       Ramesh Root EMT  2023  8:59 AM  "

## 2023-01-14 ENCOUNTER — HEALTH MAINTENANCE LETTER (OUTPATIENT)
Age: 48
End: 2023-01-14

## 2023-01-18 ENCOUNTER — OFFICE VISIT (OUTPATIENT)
Dept: SLEEP MEDICINE | Facility: CLINIC | Age: 48
End: 2023-01-18
Attending: PHYSICIAN ASSISTANT
Payer: COMMERCIAL

## 2023-01-18 DIAGNOSIS — G47.30 SLEEP APNEA, UNSPECIFIED TYPE: ICD-10-CM

## 2023-01-18 DIAGNOSIS — E66.09 CLASS 1 OBESITY DUE TO EXCESS CALORIES WITH BODY MASS INDEX (BMI) OF 33.0 TO 33.9 IN ADULT, UNSPECIFIED WHETHER SERIOUS COMORBIDITY PRESENT: ICD-10-CM

## 2023-01-18 DIAGNOSIS — R06.00 DYSPNEA AND RESPIRATORY ABNORMALITY: ICD-10-CM

## 2023-01-18 DIAGNOSIS — E66.811 CLASS 1 OBESITY DUE TO EXCESS CALORIES WITH BODY MASS INDEX (BMI) OF 33.0 TO 33.9 IN ADULT, UNSPECIFIED WHETHER SERIOUS COMORBIDITY PRESENT: ICD-10-CM

## 2023-01-18 DIAGNOSIS — G47.10 EXCESSIVE SLEEPINESS: ICD-10-CM

## 2023-01-18 DIAGNOSIS — R06.89 DYSPNEA AND RESPIRATORY ABNORMALITY: ICD-10-CM

## 2023-01-18 DIAGNOSIS — Z72.820 LACK OF ADEQUATE SLEEP: ICD-10-CM

## 2023-01-18 PROCEDURE — G0399 HOME SLEEP TEST/TYPE 3 PORTA: HCPCS | Mod: 52 | Performed by: INTERNAL MEDICINE

## 2023-01-18 ASSESSMENT — SLEEP AND FATIGUE QUESTIONNAIRES
HOW LIKELY ARE YOU TO NOD OFF OR FALL ASLEEP WHILE SITTING AND TALKING TO SOMEONE: WOULD NEVER DOZE
HOW LIKELY ARE YOU TO NOD OFF OR FALL ASLEEP IN A CAR, WHILE STOPPED FOR A FEW MINUTES IN TRAFFIC: WOULD NEVER DOZE
HOW LIKELY ARE YOU TO NOD OFF OR FALL ASLEEP WHILE SITTING INACTIVE IN A PUBLIC PLACE: SLIGHT CHANCE OF DOZING
HOW LIKELY ARE YOU TO NOD OFF OR FALL ASLEEP WHILE SITTING AND READING: MODERATE CHANCE OF DOZING
HOW LIKELY ARE YOU TO NOD OFF OR FALL ASLEEP WHILE LYING DOWN TO REST IN THE AFTERNOON WHEN CIRCUMSTANCES PERMIT: HIGH CHANCE OF DOZING
HOW LIKELY ARE YOU TO NOD OFF OR FALL ASLEEP WHEN YOU ARE A PASSENGER IN A CAR FOR AN HOUR WITHOUT A BREAK: SLIGHT CHANCE OF DOZING
HOW LIKELY ARE YOU TO NOD OFF OR FALL ASLEEP WHILE SITTING QUIETLY AFTER LUNCH WITHOUT ALCOHOL: MODERATE CHANCE OF DOZING
HOW LIKELY ARE YOU TO NOD OFF OR FALL ASLEEP WHILE WATCHING TV: MODERATE CHANCE OF DOZING

## 2023-01-18 NOTE — Clinical Note
Urgent diagnostic polysomnogram, split night study or titration polysomnogram (favor split night or titration study)

## 2023-01-19 ENCOUNTER — LAB (OUTPATIENT)
Dept: LAB | Facility: CLINIC | Age: 48
End: 2023-01-19
Payer: COMMERCIAL

## 2023-01-19 ENCOUNTER — DOCUMENTATION ONLY (OUTPATIENT)
Dept: SLEEP MEDICINE | Facility: CLINIC | Age: 48
End: 2023-01-19
Attending: PHYSICIAN ASSISTANT
Payer: COMMERCIAL

## 2023-01-19 DIAGNOSIS — N52.8 OTHER MALE ERECTILE DYSFUNCTION: ICD-10-CM

## 2023-01-19 LAB
LH SERPL-ACNC: 3.5 MIU/ML (ref 1.7–8.6)
PROLACTIN SERPL 3RD IS-MCNC: 8 NG/ML (ref 4–15)
SHBG SERPL-SCNC: 38 NMOL/L (ref 11–80)

## 2023-01-19 PROCEDURE — 83002 ASSAY OF GONADOTROPIN (LH): CPT

## 2023-01-19 PROCEDURE — 84270 ASSAY OF SEX HORMONE GLOBUL: CPT

## 2023-01-19 PROCEDURE — 84403 ASSAY OF TOTAL TESTOSTERONE: CPT

## 2023-01-19 PROCEDURE — 36415 COLL VENOUS BLD VENIPUNCTURE: CPT

## 2023-01-19 PROCEDURE — 84146 ASSAY OF PROLACTIN: CPT

## 2023-01-19 NOTE — PROGRESS NOTES
This HSAT was performed using a Noxturnal T3 device which recorded snore, sound, movement activity, body position, nasal pressure, oronasal thermal airflow, pulse, oximetry and both chest and abdominal respiratory effort. HSAT data was restricted to the time patient states they were in bed.     HSAT was scored using 1B 4% hypopnea rule.     HST AHI (Non-PAT): 96.6  Snoring was reported as loud.  Time with SpO2 below 89% was 159.7 minutes.   Overall signal quality was good     Pt will follow up with sleep provider to determine appropriate therapy.

## 2023-01-20 PROBLEM — G47.10 EXCESSIVE SLEEPINESS: Status: ACTIVE | Noted: 2022-09-08

## 2023-01-20 NOTE — PROCEDURES
"HOME SLEEP STUDY INTERPRETATION        Patient: Mark Gomez  MRN: [id number]  YOB: 1975  Study Date: 1/18/2023  PCP/Referring Provider: Mahamed Martin  Ordering Provider: Chris Zimmer PA-C         Indications for Home Study: Mark Gomez is a 47 year old male with symptoms suggestive of obstructive sleep apnea.    Estimated body mass index is 32.5 kg/m  as calculated from the following:    Height as of 1/12/23: 1.905 m (6' 3\").    Weight as of 1/12/23: 117.9 kg (260 lb).  Total score - [place]: 11 (1/18/2023  9:40 AM)  Total Score: 5 (1/18/2023  9:41 AM)        Data: A full night home sleep study was performed recording the standard physiologic parameters including body position, movement, sound, nasal pressure, thermal oral airflow, chest and abdominal movements with respiratory inductance plethysmography, and oxygen saturation by pulse oximetry. Pulse rate was estimated by oximetry recording. This study was considered adequate based on > 4 hours of quality oximetry and respiratory recording. As specified by the AASM Manual for the Scoring of Sleep and Associated events, version 2.3, Rule VIII.D 1B, 4% oxygen desaturation scoring for hypopneas is used as a standard of care on all home sleep apnea testing.        Analysis Time:  348 minutes        Respiration:   Sleep Associated Hypoxemia: sustained hypoxemia was not present. Baseline oxygen saturation was 95%.  Time with saturation less than or equal to 88% was 159 minutes. The lowest oxygen saturation was 67%.   Snoring: Snoring was present.  Respiratory events: The home study revealed a presence of 141 obstructive apneas and 399 mixed and central apneas. There were 20 hypopneas resulting in a combined apnea/hypopnea index [AHI] of 96 events per hour.  AHI was 86 per hour supine, n/a per hour prone, 112 per hour on left side, and 108 per hour on right side.   Pattern: Excluding events noted above, respiratory rate and pattern was " Normal.      Position: Percent of time spent: supine - 57%, prone - 0%, on left - 11%, on right - 31%.      Heart Rate: By pulse oximetry normal rate was noted.       Assessment:     Severe sleep apnea. Despite prponderance of mixed and central apneas, primary obstructive sleep apnea is still suspected    Sleep associated hypoxemia was present.    Recommendations:    Urgent diagnostic polysomnogram, split night study or titration polysomnogram (favor split night or titration study)    Suggest optimizing sleep hygiene and avoiding sleep deprivation.    Weight management.        Diagnosis Code(s): Obstructive Sleep Apnea G47.33, Hypoxemia G47.36, Central Sleep Apnea G47.31    Ronan Mayo MD, January 20, 2023   Diplomate, American Board of Internal Medicine, Sleep Medicine

## 2023-01-24 LAB
TESTOST FREE SERPL-MCNC: 7.23 NG/DL
TESTOST SERPL-MCNC: 389 NG/DL (ref 240–950)

## 2023-01-25 NOTE — RESULT ENCOUNTER NOTE
Deaervin Flores     Here are your recent test results which are NORMAL.  There are no concerns.      Thank You,    Please let me know if you have any questions!    Karl CHURCHILL

## 2023-03-16 ENCOUNTER — OFFICE VISIT (OUTPATIENT)
Dept: URGENT CARE | Facility: URGENT CARE | Age: 48
End: 2023-03-16
Payer: COMMERCIAL

## 2023-03-16 VITALS
DIASTOLIC BLOOD PRESSURE: 86 MMHG | SYSTOLIC BLOOD PRESSURE: 136 MMHG | HEART RATE: 81 BPM | TEMPERATURE: 98.3 F | WEIGHT: 280 LBS | OXYGEN SATURATION: 95 % | BODY MASS INDEX: 35 KG/M2

## 2023-03-16 DIAGNOSIS — J01.90 ACUTE NON-RECURRENT SINUSITIS, UNSPECIFIED LOCATION: Primary | ICD-10-CM

## 2023-03-16 PROCEDURE — 99213 OFFICE O/P EST LOW 20 MIN: CPT | Performed by: NURSE PRACTITIONER

## 2023-03-16 RX ORDER — FLUTICASONE PROPIONATE 50 MCG
1 SPRAY, SUSPENSION (ML) NASAL DAILY
Qty: 9.9 ML | Refills: 3 | Status: SHIPPED | OUTPATIENT
Start: 2023-03-16 | End: 2023-04-10

## 2023-03-16 NOTE — PATIENT INSTRUCTIONS
Ok to continue Nyquil.  Can try Flonase nasal spray daily to avoid future sinus infections.  Follow up in one week if not improved or seeming worse.

## 2023-03-16 NOTE — PROGRESS NOTES
SUBJECTIVE:   Mark Gomez is a 47 year old male presenting with a chief complaint of   Chief Complaint   Patient presents with     Pharyngitis     Cough and sinus congestion x 3 weeks. Has tried nyquil and some other OTC cold and flu medicines without relief.    .    Onset of symptoms was 3 week(s) ago.  Course of illness is waxing and waning.    Severity moderate  Current and Associated symptoms: stuffy nose, cough - productive and facial pain/pressure, green nasal drainage.  Treatment measures tried include Decongestants, nyquil  Predisposing factors include None.      Past Medical History:   Diagnosis Date     NO ACTIVE PROBLEMS      Current Outpatient Medications   Medication Sig Dispense Refill     amoxicillin-clavulanate (AUGMENTIN) 875-125 MG tablet Take 1 tablet by mouth 2 times daily for 7 days 14 tablet 0     fluticasone (FLONASE) 50 MCG/ACT nasal spray Spray 1 spray into both nostrils daily for 30 days 9.9 mL 3     tadalafil (CIALIS) 20 MG tablet Take 1 tablet (20 mg) by mouth daily as needed (take 2-4 hours before intercourse) (Patient not taking: Reported on 3/16/2023) 30 tablet 11     Social History     Tobacco Use     Smoking status: Former     Packs/day: 0.50     Years: 20.00     Pack years: 10.00     Types: Cigarettes     Quit date: 2013     Years since quittin.7     Smokeless tobacco: Never   Substance Use Topics     Alcohol use: Yes     Comment: rarely       OBJECTIVE  /86   Pulse 81   Temp 98.3  F (36.8  C) (Tympanic)   Wt 127 kg (280 lb)   SpO2 95%   BMI 35.00 kg/m      Physical Exam  HENT:      Head: Normocephalic.      Right Ear: Tympanic membrane normal.      Left Ear: Tympanic membrane normal.      Nose: Congestion and rhinorrhea present.      Comments: Sinus pressure over maxillary sinuses     Mouth/Throat:      Mouth: Mucous membranes are moist.   Eyes:      Extraocular Movements: Extraocular movements intact.      Conjunctiva/sclera: Conjunctivae normal.    Cardiovascular:      Rate and Rhythm: Normal rate and regular rhythm.      Heart sounds: Normal heart sounds.   Pulmonary:      Effort: Pulmonary effort is normal.      Breath sounds: Normal breath sounds.   Musculoskeletal:      Cervical back: Normal range of motion and neck supple.   Skin:     General: Skin is warm and dry.   Neurological:      Mental Status: He is alert.         Labs:  No results found for this or any previous visit (from the past 24 hour(s)).      ASSESSMENT:      ICD-10-CM    1. Acute non-recurrent sinusitis, unspecified location  J01.90 amoxicillin-clavulanate (AUGMENTIN) 875-125 MG tablet     fluticasone (FLONASE) 50 MCG/ACT nasal spray           Medical Decision Making:    Differential Diagnosis:  Sinusitis, Viral syndrome and Viral upper respiratory illness  Allergies    Serious Comorbid Conditions:  Obesity, Sleep apnea    PLAN:  Tylenol, Ibuprofen, Fluids, Rest and RX sinusitis  Augmentin 875mg BID for 7 days   Ok to continue Nyquil.  Can try Flonase nasal spray daily to avoid future sinus infections.  Follow up in one week if not improved or seeming worse.     Followup:    In 7 day(s) follow up with If not improving or if condition worsens, follow up with your Primary Care Provider

## 2023-03-27 ENCOUNTER — VIRTUAL VISIT (OUTPATIENT)
Dept: SLEEP MEDICINE | Facility: CLINIC | Age: 48
End: 2023-03-27
Payer: COMMERCIAL

## 2023-03-27 VITALS — HEIGHT: 75 IN | WEIGHT: 280 LBS | BODY MASS INDEX: 34.82 KG/M2

## 2023-03-27 DIAGNOSIS — G47.33 OBSTRUCTIVE SLEEP APNEA: ICD-10-CM

## 2023-03-27 DIAGNOSIS — E66.811 CLASS 1 OBESITY DUE TO EXCESS CALORIES WITH BODY MASS INDEX (BMI) OF 33.0 TO 33.9 IN ADULT, UNSPECIFIED WHETHER SERIOUS COMORBIDITY PRESENT: Primary | Chronic | ICD-10-CM

## 2023-03-27 DIAGNOSIS — E66.09 CLASS 1 OBESITY DUE TO EXCESS CALORIES WITH BODY MASS INDEX (BMI) OF 33.0 TO 33.9 IN ADULT, UNSPECIFIED WHETHER SERIOUS COMORBIDITY PRESENT: Primary | Chronic | ICD-10-CM

## 2023-03-27 DIAGNOSIS — R09.02 HYPOXEMIA: ICD-10-CM

## 2023-03-27 PROCEDURE — 99213 OFFICE O/P EST LOW 20 MIN: CPT | Mod: VID | Performed by: PHYSICIAN ASSISTANT

## 2023-03-27 ASSESSMENT — SLEEP AND FATIGUE QUESTIONNAIRES
HOW LIKELY ARE YOU TO NOD OFF OR FALL ASLEEP WHEN YOU ARE A PASSENGER IN A CAR FOR AN HOUR WITHOUT A BREAK: WOULD NEVER DOZE
HOW LIKELY ARE YOU TO NOD OFF OR FALL ASLEEP WHILE SITTING QUIETLY AFTER LUNCH WITHOUT ALCOHOL: SLIGHT CHANCE OF DOZING
HOW LIKELY ARE YOU TO NOD OFF OR FALL ASLEEP WHILE LYING DOWN TO REST IN THE AFTERNOON WHEN CIRCUMSTANCES PERMIT: HIGH CHANCE OF DOZING
HOW LIKELY ARE YOU TO NOD OFF OR FALL ASLEEP WHILE SITTING AND TALKING TO SOMEONE: WOULD NEVER DOZE
HOW LIKELY ARE YOU TO NOD OFF OR FALL ASLEEP WHILE SITTING INACTIVE IN A PUBLIC PLACE: SLIGHT CHANCE OF DOZING
HOW LIKELY ARE YOU TO NOD OFF OR FALL ASLEEP WHILE SITTING AND READING: MODERATE CHANCE OF DOZING
HOW LIKELY ARE YOU TO NOD OFF OR FALL ASLEEP WHILE WATCHING TV: MODERATE CHANCE OF DOZING
HOW LIKELY ARE YOU TO NOD OFF OR FALL ASLEEP IN A CAR, WHILE STOPPED FOR A FEW MINUTES IN TRAFFIC: WOULD NEVER DOZE

## 2023-03-27 NOTE — PROGRESS NOTES
"Virtual Visit Details    Type of service:  Video Visit     Originating Location (pt. Location): Home    Distant Location (provider location):  On-site  Platform used for Video Visit: PeopleDoc     Home Sleep Apnea Testing Results Visit:    Chief Complaint   Patient presents with     Video Visit     HST follow up       Mark Gomez is a 47 year old male who returns to [facility] after having had Home Sleep Apnea Testing. He presented with loud snoring, witnessed apnea, bruxism, excessive daytime sleepiness (ESS 12), crowded oropharynx and large neck circumference. The STOP-BANG score is 5/8.     Home Sleep Apnea Testing - 1/18/23: 260 lbs 0 oz: AHI 96/hr. Supine AHI 86/hr.   Oxygen Artur of 67%.  Baseline 95%.  Sp02 =< 88% for 159 minutes  He slept on his back (57%), prone (0%), left (11%) and right (31%) sides.   Analysis time: 348 minutes.     Mark Gomez reports that he slept Fair.     Home Sleep Apnea Testing was reviewed in detail today with Mark and a copy given to him for his records.    Past medical/surgical history, family history, social history, medications and allergies were reviewed.      Ht 1.905 m (6' 3\")   Wt 127 kg (280 lb)   BMI 35.00 kg/m      Impression/Plan:  Severe Obstructive Sleep Apnea.   Sleep associated hypoxemia was present.     Treatment options discussed today including  auto-CPAP at 6-18 cmH2O, oral appliance therapy, positional therapy, polysomnography with full night PAP titration or surgical options.    Elected treatment with auto-CPAP at 6-18 cmH2O.  WatchPAT to follow up on hypoxemia    22 minutes spent on day of encounter doing chart review,  history and exam, counseling, coordinating plan of care, documentation and further activities as noted above.      Chris Zimmer PA-C  Sleep Medicine            "

## 2023-03-27 NOTE — NURSING NOTE
Is the patient currently in the state of [place]? YES    Visit mode:VIDEO    If the visit is dropped, the patient can be reconnected by: VIDEO VISIT: Text to cell phone: [phone]    Will anyone else be joining the visit? NO    How would you like to obtain your AVS? MyChart    Are changes needed to the allergy or medication list? NO    Reason for visit:  HST follow up    IVY Camp/HERRERA

## 2023-03-28 ENCOUNTER — DOCUMENTATION ONLY (OUTPATIENT)
Dept: SLEEP MEDICINE | Facility: CLINIC | Age: 48
End: 2023-03-28
Payer: COMMERCIAL

## 2023-03-28 NOTE — PROGRESS NOTES
3/28/23- - [facility] TO CALL [phone] TO SCHEDULE SETUP IN [place].

## 2023-04-10 ENCOUNTER — OFFICE VISIT (OUTPATIENT)
Dept: URGENT CARE | Facility: URGENT CARE | Age: 48
End: 2023-04-10
Payer: COMMERCIAL

## 2023-04-10 VITALS
WEIGHT: 277 LBS | DIASTOLIC BLOOD PRESSURE: 87 MMHG | BODY MASS INDEX: 34.62 KG/M2 | OXYGEN SATURATION: 96 % | HEART RATE: 85 BPM | SYSTOLIC BLOOD PRESSURE: 141 MMHG | TEMPERATURE: 98.2 F

## 2023-04-10 DIAGNOSIS — R07.0 THROAT PAIN: ICD-10-CM

## 2023-04-10 DIAGNOSIS — R09.81 NASAL CONGESTION: ICD-10-CM

## 2023-04-10 DIAGNOSIS — J02.0 STREP THROAT: Primary | ICD-10-CM

## 2023-04-10 LAB — DEPRECATED S PYO AG THROAT QL EIA: POSITIVE

## 2023-04-10 PROCEDURE — 99213 OFFICE O/P EST LOW 20 MIN: CPT | Performed by: PHYSICIAN ASSISTANT

## 2023-04-10 PROCEDURE — 87880 STREP A ASSAY W/OPTIC: CPT | Performed by: PHYSICIAN ASSISTANT

## 2023-04-10 RX ORDER — AMOXICILLIN 875 MG
875 TABLET ORAL 2 TIMES DAILY
Qty: 20 TABLET | Refills: 0 | Status: SHIPPED | OUTPATIENT
Start: 2023-04-10 | End: 2023-04-20

## 2023-04-10 RX ORDER — IBUPROFEN 200 MG
200 TABLET ORAL EVERY 4 HOURS PRN
COMMUNITY

## 2023-04-10 NOTE — PROGRESS NOTES
Assessment & Plan     Strep throat  Will treat with amoxicillin twice daily x 10 days. Get plenty of rest and push fluids. Wash hands frequently and avoid sharing food/beverage. Can take Tylenol/ibuprofen as needed  for pain or fever control. Follow up as needed.      - amoxicillin (AMOXIL) 875 MG tablet; Take 1 tablet (875 mg) by mouth 2 times daily for 10 days    Throat pain    - Streptococcus A Rapid Screen w/Reflex to PCR - Clinic Collect    Nasal congestion  Patient would like ENT referral. Continue with flonase x 4-6 weeks and follow up if needed.     - Adult ENT  Referral; Future                 Return in about 3 days (around 4/13/2023), or if symptoms worsen or fail to improve.    Aparna La PA-C  [facility] [place]                  Subjective   Chief Complaint   Patient presents with     Sinus Problem     Sinus infection and headache, not sleeping well due to nose really stuffed up.  Seen for this same thing 3/16         HPI     URI Adult    Onset of symptoms was 1 day(s) ago.  Course of illness is same.    Severity moderate  Current and Associated symptoms: sore throat, sinus/nasal congestion   Treatment measures tried include Tylenol/Ibuprofen.  Predisposing factors include None.                  Review of Systems   Constitutional, HEENT, cardiovascular, pulmonary, gi and gu systems are negative, except as otherwise noted.      Objective    BP (!) 141/87   Pulse 85   Temp 98.2  F (36.8  C) (Tympanic)   Wt 125.6 kg (277 lb)   SpO2 96%   BMI 34.62 kg/m    Body mass index is 34.62 kg/m .  Physical Exam  Constitutional:       General: He is not in acute distress.     Appearance: He is well-developed.   HENT:      Head: Normocephalic and atraumatic.      Right Ear: Tympanic membrane and ear canal normal.      Left Ear: Tympanic membrane and ear canal normal.      Mouth/Throat:      Comments: Throat is injected, no lesions or exudates   Eyes:       Conjunctiva/sclera: Conjunctivae normal.   Cardiovascular:      Rate and Rhythm: Normal rate and regular rhythm.   Pulmonary:      Effort: Pulmonary effort is normal.      Breath sounds: Normal breath sounds.   Skin:     General: Skin is warm and dry.      Findings: No rash.   Psychiatric:         Behavior: Behavior normal.            Results for orders placed or performed in visit on 04/10/23 (from the past 24 hour(s))   Streptococcus A Rapid Screen w/Reflex to PCR - Clinic Collect    Specimen: Throat; Swab   Result Value Ref Range    Group A Strep antigen Positive (A) Negative

## 2023-04-10 NOTE — RESULT ENCOUNTER NOTE
[facility] Provider Rx: Amoxicillin (AMOXIL) 875 MG tablet, 1 tablet (875 mg) by mouth 2 times daily for 10 days

## 2023-04-24 ENCOUNTER — DOCUMENTATION ONLY (OUTPATIENT)
Dept: SLEEP MEDICINE | Facility: CLINIC | Age: 48
End: 2023-04-24
Payer: COMMERCIAL

## 2023-04-24 DIAGNOSIS — G47.33 OBSTRUCTIVE SLEEP APNEA: ICD-10-CM

## 2023-04-24 DIAGNOSIS — E66.09 CLASS 1 OBESITY DUE TO EXCESS CALORIES WITH BODY MASS INDEX (BMI) OF 33.0 TO 33.9 IN ADULT, UNSPECIFIED WHETHER SERIOUS COMORBIDITY PRESENT: Primary | Chronic | ICD-10-CM

## 2023-04-24 DIAGNOSIS — E66.811 CLASS 1 OBESITY DUE TO EXCESS CALORIES WITH BODY MASS INDEX (BMI) OF 33.0 TO 33.9 IN ADULT, UNSPECIFIED WHETHER SERIOUS COMORBIDITY PRESENT: Primary | Chronic | ICD-10-CM

## 2023-04-25 NOTE — PROGRESS NOTES
Patient was offered choice of vendor and chose [facility].  Patient Mark Gomez was set up at [place]  on April 25, 2023. Patient received a Resmed Airsense 10 Pressures were set at 6-18 cm H2O.   Patient s ramp is 5 cm H2O for Auto and FLEX/EPR is 2.  Patient received a Elvie Respironics Mask name: DREAMWEAR  Nasal mask size Standard, heated tubing and heated humidifier.  Patient has the following compliance requirements: using and visit requirements  Patient has a follow up on TBD with his primary MD.    Sylvie Arizmendi

## 2023-04-27 ENCOUNTER — DOCUMENTATION ONLY (OUTPATIENT)
Dept: SLEEP MEDICINE | Facility: CLINIC | Age: 48
End: 2023-04-27
Payer: COMMERCIAL

## 2023-04-27 DIAGNOSIS — E66.09 CLASS 1 OBESITY DUE TO EXCESS CALORIES WITH BODY MASS INDEX (BMI) OF 33.0 TO 33.9 IN ADULT, UNSPECIFIED WHETHER SERIOUS COMORBIDITY PRESENT: Primary | Chronic | ICD-10-CM

## 2023-04-27 DIAGNOSIS — E66.811 CLASS 1 OBESITY DUE TO EXCESS CALORIES WITH BODY MASS INDEX (BMI) OF 33.0 TO 33.9 IN ADULT, UNSPECIFIED WHETHER SERIOUS COMORBIDITY PRESENT: Primary | Chronic | ICD-10-CM

## 2023-04-27 NOTE — PROGRESS NOTES
3 day Sleep therapy management telephone visit    Diagnostic AHI:  96.6 HST    Confirmed with patient at time of call- Yes Patient is still interested in STM service       Subjective measures:  Patient reports things are going well with CPAP and denies any questions or concerns. Patient was given my contact information to call if they have any questions.         Order settings:  CPAP MIN CPAP MAX   6 cm H2O 18 cm H2O       Device settings:  CPAP MIN CPAP MAX EPR RESMED SOFT RESPONSE SETTING   6.0 cm  H20 18.0 cm  H20 TWO OFF       Compliance 100 %    Assessment: Nightly usage over four hours      Action plan: Patient to have 14 day STM visit. Patient has a follow up visit scheduled:   no and not required by insurance    Replacement device: No  STM ordered by provider: Yes     Total time spent on accessing and  interpreting remote patient PAP therapy data  10 minutes    Total time spent counseling, coaching  and reviewing PAP therapy data with patient  1 minutes    [id number] no

## 2023-05-11 ENCOUNTER — DOCUMENTATION ONLY (OUTPATIENT)
Dept: SLEEP MEDICINE | Facility: CLINIC | Age: 48
End: 2023-05-11
Payer: COMMERCIAL

## 2023-05-11 DIAGNOSIS — E66.09 CLASS 1 OBESITY DUE TO EXCESS CALORIES WITH BODY MASS INDEX (BMI) OF 33.0 TO 33.9 IN ADULT, UNSPECIFIED WHETHER SERIOUS COMORBIDITY PRESENT: Primary | Chronic | ICD-10-CM

## 2023-05-11 DIAGNOSIS — E66.811 CLASS 1 OBESITY DUE TO EXCESS CALORIES WITH BODY MASS INDEX (BMI) OF 33.0 TO 33.9 IN ADULT, UNSPECIFIED WHETHER SERIOUS COMORBIDITY PRESENT: Primary | Chronic | ICD-10-CM

## 2023-05-11 NOTE — PROGRESS NOTES
14  DAY STM VISIT    Diagnostic AHI:  96.6 HST    Subjective measures:    Patient feeling good using CPAP since day.  He is not tired and feeling more rested.     Assessment: Pt not meeting objective benchmarks for compliance  Patient meeting subjective benchmarks.     Action plan: pt to have 30 day STM visit.      Device type: Auto-CPAP    PAP settings: CPAP min 6.0 cm  H20       CPAP max 18.0 cm  H20      95th% pressure 11.2 cm  H20        RESMED EPR level Setting: TWO    RESMED Soft response setting:  OFF    Mask type:  Per patient choice    Objective measures: 14 day rolling measures      Compliance  85 %      Leak  13.38  lpm  last  upload      AHI 5.91   last  upload      Average number of minutes 371      Objective measure goal  Compliance   Goal >70%  Leak   Goal < 24 lpm  AHI  Goal < 5  Usage  Goal >240        Total time spent on accessing and interpreting remote patient PAP therapy data  10 minutes    Total time spent counseling, coaching  and reviewing PAP therapy data with patient  3 minutes    [id number]  [id number]  no (3 day STM)

## 2023-07-05 ASSESSMENT — SLEEP AND FATIGUE QUESTIONNAIRES
HOW LIKELY ARE YOU TO NOD OFF OR FALL ASLEEP WHILE LYING DOWN TO REST IN THE AFTERNOON WHEN CIRCUMSTANCES PERMIT: SLIGHT CHANCE OF DOZING
HOW LIKELY ARE YOU TO NOD OFF OR FALL ASLEEP WHILE SITTING QUIETLY AFTER LUNCH WITHOUT ALCOHOL: WOULD NEVER DOZE
HOW LIKELY ARE YOU TO NOD OFF OR FALL ASLEEP WHILE SITTING AND READING: SLIGHT CHANCE OF DOZING
HOW LIKELY ARE YOU TO NOD OFF OR FALL ASLEEP WHILE SITTING INACTIVE IN A PUBLIC PLACE: WOULD NEVER DOZE
HOW LIKELY ARE YOU TO NOD OFF OR FALL ASLEEP WHILE WATCHING TV: SLIGHT CHANCE OF DOZING
HOW LIKELY ARE YOU TO NOD OFF OR FALL ASLEEP WHILE SITTING AND TALKING TO SOMEONE: WOULD NEVER DOZE
HOW LIKELY ARE YOU TO NOD OFF OR FALL ASLEEP WHEN YOU ARE A PASSENGER IN A CAR FOR AN HOUR WITHOUT A BREAK: WOULD NEVER DOZE
HOW LIKELY ARE YOU TO NOD OFF OR FALL ASLEEP IN A CAR, WHILE STOPPED FOR A FEW MINUTES IN TRAFFIC: WOULD NEVER DOZE

## 2023-07-12 ENCOUNTER — VIRTUAL VISIT (OUTPATIENT)
Dept: SLEEP MEDICINE | Facility: CLINIC | Age: 48
End: 2023-07-12
Payer: COMMERCIAL

## 2023-07-12 VITALS — HEIGHT: 75 IN | BODY MASS INDEX: 33.57 KG/M2 | WEIGHT: 270 LBS

## 2023-07-12 DIAGNOSIS — G47.33 OBSTRUCTIVE SLEEP APNEA: ICD-10-CM

## 2023-07-12 DIAGNOSIS — R09.02 HYPOXEMIA: Primary | ICD-10-CM

## 2023-07-12 PROCEDURE — 99213 OFFICE O/P EST LOW 20 MIN: CPT | Mod: VID | Performed by: PHYSICIAN ASSISTANT

## 2023-07-12 ASSESSMENT — PAIN SCALES - GENERAL: PAINLEVEL: NO PAIN (0)

## 2023-07-12 NOTE — NURSING NOTE
Changed pressure from 6 to 18 cmH2O  to 8 to 18 cmH2O in Remsed Airview per provider order. Send MileIQt message confirmation to patient.     [facility] Facilitator   [facility]

## 2023-07-12 NOTE — PROGRESS NOTES
Virtual Visit Details    Type of service:  Video Visit     Originating Location (pt. Location): Home    Distant Location (provider location):  On-site  Platform used for Video Visit: [facility]     Sleep Apnea - Follow-up Visit:    Impression/Plan:  Severe obstructive sleep apnea with sleep related hypoxemia-  Excellent CPAP compliance and AHI is well controlled on CPAP 6-18 cm/H20. Daytime symptoms are improved.   Will narrow pressures to auto-CPAP 8-18 cm/H20  WatcPAT on CPAP to follow up on hypoxemia. The results will be communicated to him via GOGETMi / ?????.??.  Comprehensive DME order placed.    Mark Gomez will follow up in about 1 year or sooner if any concerns.     21 minutes spent on day of encounter doing chart review,  history and exam, counseling, coordinating plan of care, documentation and further activities as noted above.      Chris Zimmer PA-C  Sleep Medicine      History of Present Illness:  Chief Complaint   Patient presents with     RECHECK     CPAP follow-up       Mark Gomez presents for follow-up of their severe sleep apnea, managed with CPAP.     He presented with loud snoring, witnessed apnea, bruxism, excessive daytime sleepiness (ESS 12), crowded oropharynx and large neck circumference. The STOP-BANG score is 5/8.     Home Sleep Apnea Testing - 1/18/23: 260 lbs 0 oz: AHI 96/hr. Supine AHI 86/hr.   Oxygen Artur of 67%.  Baseline 95%.  Sp02 =< 88% for 159 minutes  He slept on his back (57%), prone (0%), left (11%) and right (31%) sides.   Analysis time: 348 minutes.    April 25, 2023. Patient received a Resmed Airsense 10 Pressures were set at 6-18 cm H2O    Do you use a CPAP Machine at home: Yes  Overall, on a scale of 0-10 how would you rate your CPAP (0 poor, 10 great): 8    What type of mask do you use: Nasal Pillow  Is your mask comfortable: Yes  If not, why:      Is your mask leaking: No  If yes, where do you feel it:    How many night per week does the mask leak (0-7):      Do you notice  snoring with mask on: No  Do you notice gasping arousals with mask on: No  Are you having significant oral or nasal dryness: No  Is the pressure setting comfortable: Yes  If not, why:      What is your typical bedtime: 10:30 pm  How long does it take you to go to sleep on PAP therapy: 5 minutes  What time do you typically get out of bed for the day: 5 am  How many hours on average per night are you using PAP therapy: 6  How many hours are you sleeping per night: 6  Do you feel well rested in the morning: Yes      ResMed   Auto-PAP 6.0 - 18.0 cmH2O 30 day usage data:    93% of days with > 4 hours of use. 0/30 days with no use.   Average use 398 minutes per day.   95%ile Leak 25.12 L/min.   CPAP 95% pressure 11.9 cm.   AHI 4.33 events per hour.       EPWORTH SLEEPINESS SCALE         7/5/2023     3:41 PM    Thurman Sleepiness Scale ( BRITTNEY Baum  [id number]<br>ESS - [place]/English - Final version - 21 Nov 07 - [facility] [place].)   Sitting and reading Slight chance of dozing   Watching TV Slight chance of dozing   Sitting, inactive in a public place (e.g. a theatre or a meeting) Would never doze   As a passenger in a car for an hour without a break Would never doze   Lying down to rest in the afternoon when circumstances permit Slight chance of dozing   Sitting and talking to someone Would never doze   Sitting quietly after a lunch without alcohol Would never doze   In a car, while stopped for a few minutes in traffic Would never doze   Thurman Score (MC) 3   Thurman Score (Sleep) 3       INSOMNIA SEVERITY INDEX (JACE)          7/5/2023     3:37 PM   Insomnia Severity Index (JACE)   Difficulty falling asleep 0   Difficulty staying asleep 0   Problems waking up too early 1   How SATISFIED/DISSATISFIED are you with your CURRENT sleep pattern? 0   How NOTICEABLE to others do you think your sleep problem is in terms of impairing the quality of your life? 0   How WORRIED/DISTRESSED are you about your current sleep problem? 0  "  To what extent do you consider your sleep problem to INTERFERE with your daily functioning (e.g. daytime fatigue, mood, ability to function at work/daily chores, concentration, memory, mood, etc.) CURRENTLY? 0   JACE Total Score 1       Guidelines for Scoring/Interpretation:  Total score categories:  0-7 = No clinically significant insomnia   8-14 = Subthreshold insomnia   15-21 = Clinical insomnia (moderate severity)  22-28 = Clinical insomnia (severe)  Used via courtesy of www.[employer].va.gov with permission from Teo Silva PhD., [facility]        Past medical/surgical history, family history, social history, medications and allergies were reviewed.        Problem List:  Patient Active Problem List    Diagnosis Date Noted     Obstructive sleep apnea 03/27/2023     Priority: Medium       Home Sleep Apnea Testing - 1/18/23: 260 lbs 0 oz: AHI 96/hr. Supine AHI 86/hr.   Oxygen Artur of 67%.  Baseline 95%.  Sp02 =< 88% for 159 minutes  He slept on his back (57%), prone (0%), left (11%) and right (31%) sides.   Analysis time: 348 minutes.          Class 1 obesity due to excess calories with body mass index (BMI) of 33.0 to 33.9 in adult, unspecified whether serious comorbidity present 01/04/2023     Priority: Medium     Excessive sleepiness 09/08/2022     Priority: Medium     Chronic low back pain 02/27/2012     Priority: Medium     Pudendal nerve block 02-21-12- [place] [facility].  [facility] [phone]       CARDIOVASCULAR SCREENING; LDL GOAL LESS THAN 160 10/31/2010     Priority: Medium        Ht 1.905 m (6' 3\")   Wt 122.5 kg (270 lb)   BMI 33.75 kg/m    "

## 2023-07-12 NOTE — NURSING NOTE
Is the patient currently in the state of [place]? YES    Visit mode:VIDEO    If the visit is dropped, the patient can be reconnected by: VIDEO VISIT: Send to e-mail at: [email or web address]    Will anyone else be joining the visit? NO      How would you like to obtain your AVS? MyChart    Are changes needed to the allergy or medication list? NO    Reason for visit: RECHECK (CPAP follow-up)

## 2023-08-04 ENCOUNTER — MYC MEDICAL ADVICE (OUTPATIENT)
Dept: UROLOGY | Facility: CLINIC | Age: 48
End: 2023-08-04
Payer: COMMERCIAL

## 2023-08-04 DIAGNOSIS — N52.8 OTHER MALE ERECTILE DYSFUNCTION: Primary | ICD-10-CM

## 2023-08-04 NOTE — TELEPHONE ENCOUNTER
Requesting a 90 day supply sertraline (ZOLOFT) 50 MG tablet   tadalafil (ADCIRCA/CIALIS) 20 MG tablet      Last Written Prescription Date:  Not on active med list    Discontinued Therapy completed (No AVS) Aparna La PA-C 4/10/23 1426          Last Office Visit : 1-12-23  Future Office visit:  none    Routing refill request to provider for review/approval because:  Drug not active on patient's medication list  Rx discontinue by other provider/clinic

## 2023-08-09 ENCOUNTER — PATIENT OUTREACH (OUTPATIENT)
Dept: CARE COORDINATION | Facility: CLINIC | Age: 48
End: 2023-08-09
Payer: COMMERCIAL

## 2023-08-17 RX ORDER — TADALAFIL 20 MG/1
TABLET ORAL
Qty: 30 TABLET | Refills: 5 | Status: SHIPPED | OUTPATIENT
Start: 2023-08-17

## 2023-08-23 ENCOUNTER — PATIENT OUTREACH (OUTPATIENT)
Dept: CARE COORDINATION | Facility: CLINIC | Age: 48
End: 2023-08-23
Payer: COMMERCIAL

## 2023-12-09 ENCOUNTER — HEALTH MAINTENANCE LETTER (OUTPATIENT)
Age: 48
End: 2023-12-09

## 2023-12-18 ENCOUNTER — OFFICE VISIT (OUTPATIENT)
Dept: FAMILY MEDICINE | Facility: CLINIC | Age: 48
End: 2023-12-18
Payer: COMMERCIAL

## 2023-12-18 VITALS
WEIGHT: 288 LBS | HEART RATE: 70 BPM | BODY MASS INDEX: 34 KG/M2 | OXYGEN SATURATION: 99 % | HEIGHT: 77 IN | SYSTOLIC BLOOD PRESSURE: 136 MMHG | DIASTOLIC BLOOD PRESSURE: 86 MMHG | TEMPERATURE: 98.6 F | RESPIRATION RATE: 16 BRPM

## 2023-12-18 DIAGNOSIS — Z12.11 SCREEN FOR COLON CANCER: ICD-10-CM

## 2023-12-18 DIAGNOSIS — R73.09 ELEVATED GLUCOSE: ICD-10-CM

## 2023-12-18 DIAGNOSIS — Z00.00 WELL ADULT EXAM: Primary | ICD-10-CM

## 2023-12-18 PROBLEM — E66.811 CLASS 1 OBESITY DUE TO EXCESS CALORIES WITH BODY MASS INDEX (BMI) OF 33.0 TO 33.9 IN ADULT, UNSPECIFIED WHETHER SERIOUS COMORBIDITY PRESENT: Chronic | Status: RESOLVED | Noted: 2023-01-04 | Resolved: 2023-12-18

## 2023-12-18 PROBLEM — G47.10 EXCESSIVE SLEEPINESS: Status: RESOLVED | Noted: 2022-09-08 | Resolved: 2023-12-18

## 2023-12-18 PROBLEM — E66.09 CLASS 1 OBESITY DUE TO EXCESS CALORIES WITH BODY MASS INDEX (BMI) OF 33.0 TO 33.9 IN ADULT, UNSPECIFIED WHETHER SERIOUS COMORBIDITY PRESENT: Chronic | Status: RESOLVED | Noted: 2023-01-04 | Resolved: 2023-12-18

## 2023-12-18 LAB — HBA1C MFR BLD: 5.5 % (ref 0–5.6)

## 2023-12-18 PROCEDURE — 36415 COLL VENOUS BLD VENIPUNCTURE: CPT | Performed by: FAMILY MEDICINE

## 2023-12-18 PROCEDURE — 99396 PREV VISIT EST AGE 40-64: CPT | Performed by: FAMILY MEDICINE

## 2023-12-18 PROCEDURE — 83036 HEMOGLOBIN GLYCOSYLATED A1C: CPT | Performed by: FAMILY MEDICINE

## 2023-12-18 ASSESSMENT — ENCOUNTER SYMPTOMS
NERVOUS/ANXIOUS: 0
CHILLS: 0
NAUSEA: 0
JOINT SWELLING: 0
ARTHRALGIAS: 0
DIZZINESS: 0
CONSTIPATION: 0
PARESTHESIAS: 0
EYE PAIN: 0
MYALGIAS: 0
WEAKNESS: 0
HEMATURIA: 0
SHORTNESS OF BREATH: 0
DIARRHEA: 0
PALPITATIONS: 0
SORE THROAT: 0
FEVER: 0
FREQUENCY: 0
ABDOMINAL PAIN: 0
HEADACHES: 0
DYSURIA: 0
COUGH: 0
HEMATOCHEZIA: 0
HEARTBURN: 0

## 2023-12-18 ASSESSMENT — PAIN SCALES - GENERAL: PAINLEVEL: NO PAIN (0)

## 2023-12-18 NOTE — COMMUNITY RESOURCES LIST (ENGLISH)
12/18/2023   [facility] [place]  N/A  For questions about this resource list or additional care needs, please contact your primary care clinic or care manager.  Phone: [phone]   Email: N/A   Address: [address] [place] [address]   Hours: N/A        Housing       Shelter for families  1  [facility] Distance: 24.8 miles      In-Person   [address] [facility] [place] [address]  Language: English  Hours: Mon - Fri 3:00 PM - 9:00 AM , Sat - Sun Open 24 Hours  Fees: Free   Phone: (728) 718-8068 Ext.1 Website: [email or web address]     Shelter for individuals  2  [facility] Office - Emergency Housing Assistance - Emergency housing Distance: 2.77 miles      In-Person   [address] [place] [address]  Language: English  Hours: Mon - Fri 8:00 AM - 4:30 PM  Fees: Free   Phone: (176) 125-9769 Ext.4 Email: [email or web address] [email or web address] Website: [email or web address]/agency-information     3  [facility] Distance: 21.93 miles      In-Person   [address] [place] [address]  Language: English  Hours: Mon - Sun Open 24 Hours  Fees: Sliding Fee   Phone: (254) 766-7024 Email: info@[employer] Website: http://www.[place].org          Important Numbers & Websites       Emergency Services   911  [facility] Services   311  Poison Control   (880) 250-7862  Suicide Prevention Lifeline   (999) 534-2590 (TALK)  Child Abuse Hotline   (799) 459-1160 (4-A-Child)  Sexual Assault Hotline   (535) 135-2301 (HOPE)  National Runaway Safeline   (826) 481-8325 (RUNAWAY)  All-Options Talkline   (244) 597-3053  Substance Abuse Referral   (188) 906-7340 (HELP)

## 2023-12-18 NOTE — COMMUNITY RESOURCES LIST (ENGLISH)
12/18/2023   [facility]  N/A  For additional resource needs, please contact your health insurance member services or your primary care team.  Phone: [phone]   Email: N/A   Address: [address] [place] [id number]   Hours: N/A        Housing       Shelter for families  1  [facility] Distance: 24.8 miles      In-Person   [address] [facility] [place] [address]  Language: English  Hours: Mon - Fri 3:00 PM - 9:00 AM , Sat - Sun Open 24 Hours  Fees: Free   Phone: (801) 189-1853 Ext.1 Website: [email or web address]     Shelter for individuals  2  [facility] Office - Emergency Housing Assistance - Emergency housing Distance: 2.77 miles      In-Person   [address] [place] [address]  Language: English  Hours: Mon - Fri 8:00 AM - 4:30 PM  Fees: Free   Phone: (206) 705-4260 Ext.4 Email: juan daniel@[email or web address] Website: https://[email or web address]/agency-information     3  [facility] Distance: 21.93 miles      In-Person   [address] [place] [address]  Language: English  Hours: Mon - Sun Open 24 Hours  Fees: Sliding Fee   Phone: (991) 335-1239 Email: info@SQMOS.PWRF Website: http://www.SQMOS.org          Important Numbers & Websites       [facility]   [address] [facility].org  Poison Control   (152) 776-8146 [place]  Suicide and Crisis Lifeline   988 988[facility] Hospit[facility]ine.org  Childhelp National Child Abuse Hotline   338.965.6872 Childhelphotline.org  National Sexual Assault Hotline   (505) 481-8109 (HOPE) Rainn.org  National Runaway Safeline   (318) 652-8624 (RUNAWAY) [facility].org  Pregnancy & Postpartum Support [place]   Call/text 735-013-1450 Ppsupport[place].org  Substance Abuse National Helpline ([facility]   684-227-HELP (5050) Findtreatment.gov  Emergency Services   911

## 2023-12-18 NOTE — PROGRESS NOTES
"SUBJECTIVE:   Chong is a 48 year old, presenting for the following:  Physical        12/18/2023     5:15 PM   Additional Questions   Roomed by carolann BECKWITH   Accompanied by self       Healthy Habits:     Getting at least 3 servings of Calcium per day:  Yes    Bi-annual eye exam:  Yes    Dental care twice a year:  Yes    Sleep apnea or symptoms of sleep apnea:  Sleep apnea    Diet:  Regular (no restrictions) and Breakfast skipped    Frequency of exercise:  2-3 days/week    Duration of exercise:  15-30 minutes    Taking medications regularly:  Yes    Medication side effects:  None    Additional concerns today:  No                      Social History     Tobacco Use    Smoking status: Former     Packs/day: 0.50     Years: 20.00     Additional pack years: 0.00     Total pack years: 10.00     Types: Cigarettes     Quit date: 6/1/2013     Years since quitting: 10.5    Smokeless tobacco: Never   Substance Use Topics    Alcohol use: Yes     Comment: rarely             12/18/2023     5:09 PM   Alcohol Use   Prescreen: >3 drinks/day or >7 drinks/week? No          No data to display                Last PSA: No results found for: \"PSA\"    Reviewed orders with patient. Reviewed health maintenance and updated orders accordingly -       Reviewed and updated as needed this visit by clinical staff                  Reviewed and updated as needed this visit by Provider                     Review of Systems   Constitutional:  Negative for chills and fever.   HENT:  Negative for congestion, ear pain, hearing loss and sore throat.    Eyes:  Negative for pain and visual disturbance.   Respiratory:  Negative for cough and shortness of breath.    Cardiovascular:  Negative for chest pain, palpitations and peripheral edema.   Gastrointestinal:  Negative for abdominal pain, constipation, diarrhea, heartburn, hematochezia and nausea.   Genitourinary:  Negative for dysuria, frequency, genital sores, hematuria, impotence, penile discharge and " "urgency.   Musculoskeletal:  Negative for arthralgias, joint swelling and myalgias.   Skin:  Negative for rash.   Neurological:  Negative for dizziness, weakness, headaches and paresthesias.   Psychiatric/Behavioral:  Negative for mood changes. The patient is not nervous/anxious.          OBJECTIVE:   /86   Pulse 70   Temp 98.6  F (37  C) (Tympanic)   Resp 16   Ht 1.943 m (6' 4.5\")   Wt 130.6 kg (288 lb)   SpO2 99%   BMI 34.60 kg/m      Gen: alert and oriented, in no acute distress, affect within normal limits  Neck: supple with no masses or nodes  Throat: oropharynx clear, no exudate or tonsillar/palate asymmetry.    CV: RRR, no murmur  Lungs: clear bilaterally with good effort  Abd: nontender, no mass  Ext: no edema or lesions   Neuro: moving all extremities, gait normal, no focal deficts noted    ASSESSMENT/PLAN:   Well exam  Elevated glucose    Update A1c.  Colonoscopy.  Declines shots        COUNSELING:   Reviewed preventive health counseling, as reflected in patient instructions       Regular exercise       Healthy diet/nutrition      BMI:   Estimated body mass index is 33.75 kg/m  as calculated from the following:    Height as of 7/12/23: 1.905 m (6' 3\").    Weight as of 7/12/23: 122.5 kg (270 lb).   Weight management plan: diet/exercise       He reports that he quit smoking about 10 years ago. His smoking use included cigarettes. He has a 10.00 pack-year smoking history. He has never used smokeless tobacco.          Mahamed Martin MD  [facility]  "

## 2024-09-24 ENCOUNTER — VIRTUAL VISIT (OUTPATIENT)
Dept: SLEEP MEDICINE | Facility: CLINIC | Age: 49
End: 2024-09-24
Payer: COMMERCIAL

## 2024-09-24 VITALS — HEIGHT: 75 IN | WEIGHT: 275 LBS | BODY MASS INDEX: 34.19 KG/M2

## 2024-09-24 DIAGNOSIS — G47.33 OSA (OBSTRUCTIVE SLEEP APNEA): Primary | ICD-10-CM

## 2024-09-24 PROCEDURE — 99213 OFFICE O/P EST LOW 20 MIN: CPT | Mod: 95 | Performed by: PHYSICIAN ASSISTANT

## 2024-09-24 ASSESSMENT — PAIN SCALES - GENERAL: PAINLEVEL: NO PAIN (0)

## 2024-09-24 NOTE — H&P (VIEW-ONLY)
Virtual Visit Details    Type of service:  Video Visit     Originating Location (pt. Location): Home    Distant Location (provider location):  On-site  Platform used for Video Visit: [facility]    Sleep Apnea - Follow-up Visit:    Impression/Plan:  Severe obstructive sleep apnea with sleep related hypoxemia-  Excellent CPAP compliance and AHI is well controlled on CPAP 8-18 cm/H20. Daytime symptoms are improved.   Continue current treatment.   Comprehensive DME order placed.  WatcPAT on CPAP to follow up on hypoxemia       Mark Gomez will follow up in about 2 years or sooner if any concerns.     23 minutes spent on day of encounter doing chart review,  history and exam, counseling, coordinating plan of care, documentation and further activities as noted above.       Chris Zimmer PA-C  Sleep Medicine     History of Present Illness:  Chief Complaint   Patient presents with    RECHECK    CPAP Follow Up       Mark Gomez presents for follow-up of their severe sleep apnea, managed with CPAP.     He presented with loud snoring, witnessed apnea, bruxism, excessive daytime sleepiness (ESS 12), crowded oropharynx and large neck circumference. The STOP-BANG score is 5/8.     Home Sleep Apnea Testing - 1/18/23: 260 lbs 0 oz: AHI 96/hr. Supine AHI 86/hr.   Oxygen Artur of 67%.  Baseline 95%.  Sp02 =< 88% for 159 minutes  He slept on his back (57%), prone (0%), left (11%) and right (31%) sides.   Analysis time: 348 minutes.    April 25, 2023. Patient received a Resmed Airsense 10 Pressures were set at 6-18 cm H2O    Do you use a CPAP Machine at home: Yes  Overall, on a scale of 0-10 how would you rate your CPAP (0 poor, 10 great): 10    What type of mask do you use: Nasal Pillow  Is your mask comfortable: Yes  If not, why:      Is your mask leaking: No  If yes, where do you feel it:    How many night per week does the mask leak (0-7):      Do you notice snoring with mask on: No  Do you notice gasping arousals with mask  on: No  Are you having significant oral or nasal dryness: No  Is the pressure setting comfortable: Yes  If not, why:      What is your typical bedtime: 10pm  How long does it take you to go to sleep on PAP therapy: 5 minutes  What time do you typically get out of bed for the day: 6am  How many hours on average per night are you using PAP therapy: 7  How many hours are you sleeping per night: 7  Do you feel well rested in the morning: Yes      ResMed   Auto-PAP 8.0 - 18.0 cmH2O 30 day usage data:    96% of days with > 4 hours of use. 1/30 days with no use.   Average use 397 minutes per day.   95%ile Leak 11.92 L/min.   CPAP 95% pressure 10.4 cm.   AHI 2.22 events per hour.       EPWORTH SLEEPINESS SCALE         9/19/2024    10:20 AM    Martelle Sleepiness Scale ( BRITTNEY Baum  [id number]<br>ESS - [place]/English - Final version - 21 Nov 07 - [facility] [place].)   Sitting and reading Slight chance of dozing   Watching TV Slight chance of dozing   Sitting, inactive in a public place (e.g. a theatre or a meeting) Would never doze   As a passenger in a car for an hour without a break Slight chance of dozing   Lying down to rest in the afternoon when circumstances permit High chance of dozing   Sitting and talking to someone Would never doze   Sitting quietly after a lunch without alcohol Slight chance of dozing   In a car, while stopped for a few minutes in traffic Would never doze   Martelle Score (MC) 7   Martelle Score (Sleep) 7       INSOMNIA SEVERITY INDEX (JACE)          9/19/2024    10:15 AM   Insomnia Severity Index (JACE)   Difficulty falling asleep 0   Difficulty staying asleep 0   Problems waking up too early 0   How SATISFIED/DISSATISFIED are you with your CURRENT sleep pattern? 0   How NOTICEABLE to others do you think your sleep problem is in terms of impairing the quality of your life? 0   How WORRIED/DISTRESSED are you about your current sleep problem? 0   To what extent do you consider your sleep problem to  "INTERFERE with your daily functioning (e.g. daytime fatigue, mood, ability to function at work/daily chores, concentration, memory, mood, etc.) CURRENTLY? 0   JACE Total Score 0       Guidelines for Scoring/Interpretation:  Total score categories:  0-7 = No clinically significant insomnia   8-14 = Subthreshold insomnia   15-21 = Clinical insomnia (moderate severity)  22-28 = Clinical insomnia (severe)  Used via courtesy of www.[employer] Limitedealth.va.gov with permission from Teo Silva PhD., [facility]        Past medical/surgical history, family history, social history, medications and allergies were reviewed.        Problem List:  Patient Active Problem List    Diagnosis Date Noted    Elevated glucose 12/18/2023     Priority: Medium    Obstructive sleep apnea 03/27/2023     Priority: Medium       Home Sleep Apnea Testing - 1/18/23: 260 lbs 0 oz: AHI 96/hr. Supine AHI 86/hr.   Oxygen Artur of 67%.  Baseline 95%.  Sp02 =< 88% for 159 minutes  He slept on his back (57%), prone (0%), left (11%) and right (31%) sides.   Analysis time: 348 minutes.         Chronic low back pain 02/27/2012     Priority: Medium     Pudendal nerve block 02-21-12- [place] [facility] [place]  [facility] [phone]      CARDIOVASCULAR SCREENING; LDL GOAL LESS THAN 160 10/31/2010     Priority: Medium        Ht 1.905 m (6' 3\")   Wt 124.7 kg (275 lb)   BMI 34.37 kg/m     "

## 2024-09-24 NOTE — NURSING NOTE
Current patient location:  work     Is the patient currently in the state of [place]? YES    Visit mode:VIDEO    If the visit is dropped, the patient can be reconnected by: VIDEO VISIT: Text to cell phone:   Telephone Information:   Mobile [phone]       Will anyone else be joining the visit? NO  (If patient encounters technical issues they should call [phone] :[phone])    How would you like to obtain your AVS? MyChart    Are changes needed to the allergy or medication list? Pt stated no med changes    Are refills needed on medications prescribed by this physician? NO    Rooming Documentation:  Questionnaire(s) completed    Reason for visit: RECHECK and CPAP Follow Up    Tiana CORREA

## 2024-09-24 NOTE — PATIENT INSTRUCTIONS
Your Body mass index is 34.37 kg/m .  Weight management is a personal decision.  If you are interested in exploring weight loss strategies, the following discussion covers the approaches that may be successful. Body mass index (BMI) is one way to tell whether you are at a healthy weight, overweight, or obese. It measures your weight in relation to your height.  A BMI of 18.5 to 24.9 is in the healthy range. A person with a BMI of 25 to 29.9 is considered overweight, and someone with a BMI of 30 or greater is considered obese. More than two-thirds of American adults are considered overweight or obese.  Being overweight or obese increases the risk for further weight gain. Excess weight may lead to heart disease and diabetes.  Creating and following plans for healthy eating and physical activity may help you improve your health.  Weight control is part of healthy lifestyle and includes exercise, emotional health, and healthy eating habits. Careful eating habits lifelong are the mainstay of weight control. Though there are significant health benefits from weight loss, long-term weight loss with diet alone may be very difficult to achieve- studies show long-term success with dietary management in less than 10% of people. Attaining a healthy weight may be especially difficult to achieve in those with severe obesity. In some cases, medications, devices and surgical management might be considered.  What can you do?  If you are overweight or obese and are interested in methods for weight loss, you should discuss this with your provider.   Consider reducing daily calorie intake by 500 calories.   Keep a food journal.   Avoiding skipping meals, consider cutting portions instead.    Diet combined with exercise helps maintain muscle while optimizing fat loss. Strength training is particularly important for building and maintaining muscle mass. Exercise helps reduce stress, increase energy, and improves fitness. Increasing  exercise without diet control, however, may not burn enough calories to loose weight.     Start walking three days a week 10-20 minutes at a time  Work towards walking thirty minutes five days a week   Eventually, increase the speed of your walking for 1-2 minutes at time    In addition, we recommend that you review healthy lifestyles and methods for weight loss available through the National Institutes of Health patient information sites:  http://win.niddk.nih.gov/publications/index.htm    And look into health and wellness programs that may be available through your health insurance provider, employer, local community center, or estefani club.

## 2024-09-24 NOTE — PROGRESS NOTES
Virtual Visit Details    Type of service:  Video Visit     Originating Location (pt. Location): Home    Distant Location (provider location):  On-site  Platform used for Video Visit: [facility]    Sleep Apnea - Follow-up Visit:    Impression/Plan:  Severe obstructive sleep apnea with sleep related hypoxemia-  Excellent CPAP compliance and AHI is well controlled on CPAP 8-18 cm/H20. Daytime symptoms are improved.   Continue current treatment.   Comprehensive DME order placed.  WatcPAT on CPAP to follow up on hypoxemia       Mark Gomez will follow up in about 2 years or sooner if any concerns.     23 minutes spent on day of encounter doing chart review,  history and exam, counseling, coordinating plan of care, documentation and further activities as noted above.       Chris Zimmer PA-C  Sleep Medicine     History of Present Illness:  Chief Complaint   Patient presents with    RECHECK    CPAP Follow Up       Mark Gomez presents for follow-up of their severe sleep apnea, managed with CPAP.     He presented with loud snoring, witnessed apnea, bruxism, excessive daytime sleepiness (ESS 12), crowded oropharynx and large neck circumference. The STOP-BANG score is 5/8.     Home Sleep Apnea Testing - 1/18/23: 260 lbs 0 oz: AHI 96/hr. Supine AHI 86/hr.   Oxygen Artur of 67%.  Baseline 95%.  Sp02 =< 88% for 159 minutes  He slept on his back (57%), prone (0%), left (11%) and right (31%) sides.   Analysis time: 348 minutes.    April 25, 2023. Patient received a Resmed Airsense 10 Pressures were set at 6-18 cm H2O    Do you use a CPAP Machine at home: Yes  Overall, on a scale of 0-10 how would you rate your CPAP (0 poor, 10 great): 10    What type of mask do you use: Nasal Pillow  Is your mask comfortable: Yes  If not, why:      Is your mask leaking: No  If yes, where do you feel it:    How many night per week does the mask leak (0-7):      Do you notice snoring with mask on: No  Do you notice gasping arousals with mask  on: No  Are you having significant oral or nasal dryness: No  Is the pressure setting comfortable: Yes  If not, why:      What is your typical bedtime: 10pm  How long does it take you to go to sleep on PAP therapy: 5 minutes  What time do you typically get out of bed for the day: 6am  How many hours on average per night are you using PAP therapy: 7  How many hours are you sleeping per night: 7  Do you feel well rested in the morning: Yes      ResMed   Auto-PAP 8.0 - 18.0 cmH2O 30 day usage data:    96% of days with > 4 hours of use. 1/30 days with no use.   Average use 397 minutes per day.   95%ile Leak 11.92 L/min.   CPAP 95% pressure 10.4 cm.   AHI 2.22 events per hour.       EPWORTH SLEEPINESS SCALE         9/19/2024    10:20 AM    Marvell Sleepiness Scale ( BRITTNEY Baum  [id number]<br>ESS - [place]/English - Final version - 21 Nov 07 - [facility] [place].)   Sitting and reading Slight chance of dozing   Watching TV Slight chance of dozing   Sitting, inactive in a public place (e.g. a theatre or a meeting) Would never doze   As a passenger in a car for an hour without a break Slight chance of dozing   Lying down to rest in the afternoon when circumstances permit High chance of dozing   Sitting and talking to someone Would never doze   Sitting quietly after a lunch without alcohol Slight chance of dozing   In a car, while stopped for a few minutes in traffic Would never doze   Marvell Score (MC) 7   Marvell Score (Sleep) 7       INSOMNIA SEVERITY INDEX (JACE)          9/19/2024    10:15 AM   Insomnia Severity Index (JACE)   Difficulty falling asleep 0   Difficulty staying asleep 0   Problems waking up too early 0   How SATISFIED/DISSATISFIED are you with your CURRENT sleep pattern? 0   How NOTICEABLE to others do you think your sleep problem is in terms of impairing the quality of your life? 0   How WORRIED/DISTRESSED are you about your current sleep problem? 0   To what extent do you consider your sleep problem to  "INTERFERE with your daily functioning (e.g. daytime fatigue, mood, ability to function at work/daily chores, concentration, memory, mood, etc.) CURRENTLY? 0   JACE Total Score 0       Guidelines for Scoring/Interpretation:  Total score categories:  0-7 = No clinically significant insomnia   8-14 = Subthreshold insomnia   15-21 = Clinical insomnia (moderate severity)  22-28 = Clinical insomnia (severe)  Used via courtesy of www.[employer] Networksealth.va.gov with permission from Teo Silva PhD., [facility]        Past medical/surgical history, family history, social history, medications and allergies were reviewed.        Problem List:  Patient Active Problem List    Diagnosis Date Noted    Elevated glucose 12/18/2023     Priority: Medium    Obstructive sleep apnea 03/27/2023     Priority: Medium       Home Sleep Apnea Testing - 1/18/23: 260 lbs 0 oz: AHI 96/hr. Supine AHI 86/hr.   Oxygen Artur of 67%.  Baseline 95%.  Sp02 =< 88% for 159 minutes  He slept on his back (57%), prone (0%), left (11%) and right (31%) sides.   Analysis time: 348 minutes.         Chronic low back pain 02/27/2012     Priority: Medium     Pudendal nerve block 02-21-12- [place] [facility] [place].  [facility] [phone]      CARDIOVASCULAR SCREENING; LDL GOAL LESS THAN 160 10/31/2010     Priority: Medium        Ht 1.905 m (6' 3\")   Wt 124.7 kg (275 lb)   BMI 34.37 kg/m     "

## 2024-10-18 ENCOUNTER — ANESTHESIA EVENT (OUTPATIENT)
Dept: GASTROENTEROLOGY | Facility: CLINIC | Age: 49
End: 2024-10-18
Payer: COMMERCIAL

## 2024-10-18 NOTE — ANESTHESIA PREPROCEDURE EVALUATION
Anesthesia Pre-Procedure Evaluation    Patient: Mark Gomez   MRN: [id number] : 1975        Procedure : Procedure(s):  Colonoscopy          Past Medical History:   Diagnosis Date    NO ACTIVE PROBLEMS       Past Surgical History:   Procedure Laterality Date    EYE SURGERY      strabismus child    ORTHOPEDIC SURGERY      right wrist fracture age 13      No Known Allergies   Social History     Tobacco Use    Smoking status: Former     Current packs/day: 0.00     Average packs/day: 0.5 packs/day for 20.0 years (10.0 ttl pk-yrs)     Types: Cigarettes     Start date: 1993     Quit date: 2013     Years since quittin.3    Smokeless tobacco: Never   Substance Use Topics    Alcohol use: Yes     Comment: rarely      Wt Readings from Last 1 Encounters:   24 124.7 kg (275 lb)        Anesthesia Evaluation   Pt has had prior anesthetic. Type: General.        ROS/MED HX  ENT/Pulmonary:     (+) sleep apnea,                                       Neurologic:  - neg neurologic ROS     Cardiovascular:     (+) Dyslipidemia - -   -  - -                                      METS/Exercise Tolerance:     Hematologic:  - neg hematologic  ROS     Musculoskeletal: Comment: Lumbago   - neg musculoskeletal ROS     GI/Hepatic:  - neg GI/hepatic ROS     Renal/Genitourinary:  - neg Renal ROS     Endo:     (+)               Obesity,       Psychiatric/Substance Use:  - neg psychiatric ROS     Infectious Disease:  - neg infectious disease ROS     Malignancy:  - neg malignancy ROS     Other:  - neg other ROS          Physical Exam    Airway        Mallampati: II   TM distance: > 3 FB   Neck ROM: full   Mouth opening: > 3 cm    Respiratory Devices and Support         Dental           Cardiovascular   cardiovascular exam normal          Pulmonary   pulmonary exam normal            OUTSIDE LABS:  CBC:   Lab Results   Component Value Date    WBC 8.7 2022    WBC 6.5 2014    HGB 15.3 2022    HGB 16.2  "01/27/2014    HCT 45.5 08/21/2022    HCT 46.8 01/27/2014     08/21/2022     01/27/2014     BMP:   Lab Results   Component Value Date     08/21/2022     11/24/2017    POTASSIUM 4.1 08/21/2022    POTASSIUM 3.8 11/24/2017    CHLORIDE 107 08/21/2022    CHLORIDE 104 11/24/2017    CO2 28 08/21/2022    CO2 29 11/24/2017    BUN 14 08/21/2022    BUN 12 11/24/2017    CR 0.90 08/21/2022    CR 0.96 11/24/2017     (H) 10/06/2022    GLC 98 08/21/2022     COAGS: No results found for: \"PTT\", \"INR\", \"FIBR\"  POC: No results found for: \"BGM\", \"HCG\", \"HCGS\"  HEPATIC:   Lab Results   Component Value Date    ALBUMIN 3.9 08/21/2022    PROTTOTAL 7.4 08/21/2022    ALT 49 08/21/2022    AST 35 08/21/2022    ALKPHOS 52 08/21/2022    BILITOTAL 0.7 08/21/2022     OTHER:   Lab Results   Component Value Date    A1C 5.5 12/18/2023    WILLARD 8.8 08/21/2022    TSH 1.89 01/27/2014    T4 0.91 09/21/2010       Anesthesia Plan    ASA Status:  2       Anesthesia Type: General.     - Airway: Native airway   Induction: Propofol.   Maintenance: TIVA.        Consents    Anesthesia Plan(s) and associated risks, benefits, and realistic alternatives discussed. Questions answered and patient/representative(s) expressed understanding.     - Discussed: Risks, Benefits and Alternatives for BOTH SEDATION and the PROCEDURE were discussed     - Discussed with:  Patient            Postoperative Care    Pain management: IV analgesics, Oral pain medications, Multi-modal analgesia.   PONV prophylaxis: Ondansetron (or other 5HT-3), Dexamethasone or Solumedrol     Comments:               ANIL Morales CRNA    I have reviewed the pertinent notes and labs in the chart from the past 30 days and (re)examined the patient.  Any updates or changes from those notes are reflected in this note.                       # Obesity: Estimated body mass index is 34.37 kg/m  as calculated from the following:    Height as of 9/24/24: 1.905 m (6' 3\").    " Weight as of 9/24/24: 124.7 kg (275 lb).

## 2024-10-21 ENCOUNTER — HOSPITAL ENCOUNTER (OUTPATIENT)
Facility: CLINIC | Age: 49
Discharge: HOME | End: 2024-10-21
Attending: SURGERY | Admitting: SURGERY
Payer: COMMERCIAL

## 2024-10-21 ENCOUNTER — ANESTHESIA (OUTPATIENT)
Dept: GASTROENTEROLOGY | Facility: CLINIC | Age: 49
End: 2024-10-21
Payer: COMMERCIAL

## 2024-10-21 VITALS
HEIGHT: 75 IN | WEIGHT: 275 LBS | HEART RATE: 72 BPM | RESPIRATION RATE: 16 BRPM | BODY MASS INDEX: 34.19 KG/M2 | OXYGEN SATURATION: 99 % | SYSTOLIC BLOOD PRESSURE: 118 MMHG | DIASTOLIC BLOOD PRESSURE: 87 MMHG | TEMPERATURE: 98.6 F

## 2024-10-21 LAB — COLONOSCOPY: NORMAL

## 2024-10-21 PROCEDURE — 250N000009 HC RX 250: Performed by: NURSE ANESTHETIST, CERTIFIED REGISTERED

## 2024-10-21 PROCEDURE — 370N000017 HC ANESTHESIA TECHNICAL FEE, PER MIN: Performed by: SURGERY

## 2024-10-21 PROCEDURE — 88305 TISSUE EXAM BY PATHOLOGIST: CPT | Mod: TC | Performed by: SURGERY

## 2024-10-21 PROCEDURE — 45385 COLONOSCOPY W/LESION REMOVAL: CPT | Performed by: SURGERY

## 2024-10-21 PROCEDURE — 88305 TISSUE EXAM BY PATHOLOGIST: CPT | Mod: 26 | Performed by: PATHOLOGY

## 2024-10-21 PROCEDURE — 250N000011 HC RX IP 250 OP 636: Performed by: NURSE ANESTHETIST, CERTIFIED REGISTERED

## 2024-10-21 RX ORDER — PROPOFOL 10 MG/ML
INJECTION, EMULSION INTRAVENOUS CONTINUOUS PRN
Status: DISCONTINUED | OUTPATIENT
Start: 2024-10-21 | End: 2024-10-21

## 2024-10-21 RX ORDER — LIDOCAINE HYDROCHLORIDE 20 MG/ML
INJECTION, SOLUTION INFILTRATION; PERINEURAL PRN
Status: DISCONTINUED | OUTPATIENT
Start: 2024-10-21 | End: 2024-10-21

## 2024-10-21 RX ORDER — LIDOCAINE 40 MG/G
CREAM TOPICAL
Status: DISCONTINUED | OUTPATIENT
Start: 2024-10-21 | End: 2024-10-21 | Stop reason: HOSPADM

## 2024-10-21 RX ADMIN — PROPOFOL 150 MCG/KG/MIN: 10 INJECTION, EMULSION INTRAVENOUS at 10:08

## 2024-10-21 RX ADMIN — LIDOCAINE HYDROCHLORIDE 100 MG: 20 INJECTION, SOLUTION INFILTRATION; PERINEURAL at 10:08

## 2024-10-21 ASSESSMENT — ACTIVITIES OF DAILY LIVING (ADL)
ADLS_ACUITY_SCORE: 35

## 2024-10-21 NOTE — INTERVAL H&P NOTE
"I have reviewed the surgical (or preoperative) H&P that is linked to this encounter, and examined the patient. There are no significant changes    Colon screening; no famhx of colon ca; no blood thinner    Clinical Conditions Present on Arrival:  Clinically Significant Risk Factors Present on Admission                       # Obesity: Estimated body mass index is 34.37 kg/m  as calculated from the following:    Height as of this encounter: 1.905 m (6' 3\").    Weight as of this encounter: 124.7 kg (275 lb).       "

## 2024-10-21 NOTE — ANESTHESIA POSTPROCEDURE EVALUATION
[facility]    Subjective:     Chief Complaint:   Chief Complaint   Patient presents with    Right Forearm - Pain     Right forearm MRI/ NCV f/u. States that it does feel better today.        History reviewed. No pertinent past medical history.    Past Surgical History:   Procedure Laterality Date    HERNIA REPAIR         Current Outpatient Medications   Medication Sig    VYVANSE 50 mg capsule TK ONE C PO  QAM    cetirizine (ZYRTEC) 10 MG tablet Take 1 tablet (10 mg total) by mouth once daily.     No current facility-administered medications for this visit.        Review of patient's allergies indicates:  No Known Allergies    Family History   Problem Relation Age of Onset    No Known Problems Mother     No Known Problems Father        Social History     Socioeconomic History    Marital status: Single     Spouse name: Not on file    Number of children: Not on file    Years of education: Not on file    Highest education level: Not on file   Social Needs    Financial resource strain: Not on file    Food insecurity - worry: Not on file    Food insecurity - inability: Not on file    Transportation needs - medical: Not on file    Transportation needs - non-medical: Not on file   Occupational History    Not on file   Tobacco Use    Smoking status: Never Smoker   Substance and Sexual Activity    Alcohol use: Not on file    Drug use: Not on file    Sexual activity: Not on file   Other Topics Concern    Not on file   Social History Narrative    Not on file       History of present illness: Follow-up on his right elbow. We have a nerve conduction EMG and it shows that there is some issues with the ulnar nerve at the elbow. Nothing with the radial nerve or median nerve. There is slowing 38 at the    Elbow. We also have an MRI of the forearm and it was read as normal. I do not see any specific issues at the interosseous membrane etc. His symptoms are less today than they were prior he has noted  Patient: Mark Gomez    Procedure: Procedure(s):  COLONOSCOPY, FLEXIBLE, WITH LESION REMOVAL USING SNARE       Anesthesia Type:  General    Note:  Disposition: Outpatient   Postop Pain Control: Uneventful            Sign Out: Well controlled pain   PONV: No   Neuro/Psych: Uneventful            Sign Out: Acceptable/Baseline neuro status   Airway/Respiratory: Uneventful            Sign Out: Acceptable/Baseline resp. status   CV/Hemodynamics: Uneventful            Sign Out: Acceptable CV status; No obvious hypovolemia; No obvious fluid overload   Other NRE: NONE   DID A NON-ROUTINE EVENT OCCUR? No           Last vitals:  Vitals Value Taken Time   /70 10/21/24 1030   Temp     Pulse 72 10/21/24 1030   Resp     SpO2 96 % 10/21/24 1034   Vitals shown include unfiled device data.    Electronically Signed By: ANIL Hadley CRNA  October 21, 2024  10:40 AM   some ulnar nerve issues perhaps in the past which have past now. He does not have numbness in the little finger he does not have weakness in the arm. He has pain with certain activities. And when he does certain activities a lot and the arm flares up. The pain is proximal forearm inside the arm and dorsally at the forearm. So it's not specifically in the ulnar nerve distribution  Review of Systems:    Constitution: Negative for chills, fever, and sweats.  Negative for unexplained weight loss.    HENT:  Negative for headaches and blurry vision.    Cardiovascular:Negative for chest pain or irregular heart beat. Negative for hypertension.    Respiratory:  Negative for cough and shortness of breath.    Gastrointestinal: Negative for abdominal pain, heartburn, melena, nausea, and vomitting.    Genitourinary:  Negative bladder incontinence and dysuria.    Musculoskeletal:  See HPI for details.     Neurological: Negative for numbness.    Psychiatric/Behavioral: Negative for depression.  The patient is not nervous/anxious.      Endocrine: Negative for polyuria    Hematologic/Lymphatic: Negative for bleeding problem.  Does not bruise/bleed easily.    Skin: Negative for poor would healing and rash    Objective:      Physical Examination:    Vital Signs:    Vitals:    03/06/19 0836   BP: 138/88   Pulse: 83       Body mass index is 29.53 kg/m².    This a well-developed, well nourished patient in no acute distress.  They are alert and oriented and cooperative to examination.        Sewn physical exam the ulnar notch is not tender nerve is nontender does not sublux. His  on the right is 1 tender 120  on the left is the same he does not have weakness of the intrinsics he does not have numbness in the little finger. His have some limitation of supination and pronation of the right elbow compared to the left he lacks maybe 15-20° of supination and pronation he lacks at least 30° which causes discomfort in the right arm.  Flexion-extension no issue I cannot put my finger on any one particular tender spot.  Pertinent New Results:    XRAY Report / Interpretation:       Assessment/Plan:      So my impression is the lack of rotation of the forearm is present and I think is symptomatic. The ulnar nerve I think is not symptomatic. I don't have a strong mechanical reason for the change in his rotation of the forearm compared to the other arm. He feels like his joints are relatively stiff in general and he does have a minimal extension block both elbows. Our plan is physical therapy to see if we can stretch out the rotation in the right forearm I don't have anything specific to inject. He does not have pain all day long. See him back in 6 weeks      This note was created using Dragon voice recognition software that occasionally misinterpreted phrases or words.

## 2024-10-21 NOTE — LETTER
Mark Gomez  [id number] [facility] [id number]    October 22, 2024    Dear Mark,  This letter is written to inform you of the results of your recent colonoscopy.  Your examination showed polyp(s) in your transverse colon. All polyps were removed in their entirety and sent for review by a pathologist. As you will see on the pathology report below, the tissue(s) were tubular adenomatous polyps. Your examination was otherwise without abnormality.    Final Diagnosis   A. Colon, splenic flexure, polyp, polypectomy:  -Fragments of tubular adenoma  -Negative for high-grade dysplasia and malignancy.       Adenomatous polyps are entirely benign (non-cancerous); however, patients who have developed these polyps are at an increased risk for developing additional polyps in the future. If these are not eventually removed, there is a risk of developing colon cancer. We will advise more frequent examinations with you because of the risk associated with this type of polyp.    Given these findings, I recommend that you undergo a repeat colonoscopy in 7 year(s) for surveillance. We will enter you into a recall system so you receive a reminder closer to the time that you are due for repeat examination.     Please remember that this recommendation is made with the understanding that you are not experiencing persistent changes in bowel function, bleeding per rectum, and/or significant abdominal pain. If you experience these symptoms, please contact your primary care provider for a further evaluation.     If you have any questions or concerns about the results of your colonoscopy or the appropriate follow-up, please contact the general surgery access center at [phone].            Sincerely,        [facility] DO Khan [facility]

## 2024-10-21 NOTE — ANESTHESIA CARE TRANSFER NOTE
Patient: Mark Gomez    Procedure: Procedure(s):  COLONOSCOPY, FLEXIBLE, WITH LESION REMOVAL USING SNARE       Diagnosis: Screen for colon cancer [Z12.11]  Diagnosis Additional Information: No value filed.    Anesthesia Type:   General     Note:    Oropharynx: oropharynx clear of all foreign objects  Level of Consciousness: awake  Oxygen Supplementation: room air    Independent Airway: airway patency satisfactory and stable  Dentition: dentition unchanged  Vital Signs Stable: post-procedure vital signs reviewed and stable  Report to RN Given: handoff report given  Patient transferred to: Phase II    Handoff Report: Identifed the Patient, Identified the Reponsible Provider, Reviewed the pertinent medical history, Discussed the surgical course, Reviewed Intra-OP anesthesia mangement and issues during anesthesia, Set expectations for post-procedure period and Allowed opportunity for questions and acknowledgement of understanding      Vitals:  Vitals Value Taken Time   /70 10/21/24 1030   Temp     Pulse 72 10/21/24 1030   Resp     SpO2 96 % 10/21/24 1032   Vitals shown include unfiled device data.    Electronically Signed By: ANIL Hadley CRNA  October 21, 2024  10:33 AM

## 2024-10-22 LAB
PATH REPORT.COMMENTS IMP SPEC: NORMAL
PATH REPORT.COMMENTS IMP SPEC: NORMAL
PATH REPORT.FINAL DX SPEC: NORMAL
PATH REPORT.GROSS SPEC: NORMAL
PATH REPORT.MICROSCOPIC SPEC OTHER STN: NORMAL
PATH REPORT.RELEVANT HX SPEC: NORMAL
PHOTO IMAGE: NORMAL

## 2024-11-06 ENCOUNTER — PATIENT OUTREACH (OUTPATIENT)
Dept: GASTROENTEROLOGY | Facility: CLINIC | Age: 49
End: 2024-11-06
Payer: COMMERCIAL

## 2024-11-18 SDOH — HEALTH STABILITY: PHYSICAL HEALTH: ON AVERAGE, HOW MANY MINUTES DO YOU ENGAGE IN EXERCISE AT THIS LEVEL?: 20 MIN

## 2024-11-18 SDOH — HEALTH STABILITY: PHYSICAL HEALTH: ON AVERAGE, HOW MANY DAYS PER WEEK DO YOU ENGAGE IN MODERATE TO STRENUOUS EXERCISE (LIKE A BRISK WALK)?: 2 DAYS

## 2024-11-18 ASSESSMENT — SOCIAL DETERMINANTS OF HEALTH (SDOH): HOW OFTEN DO YOU GET TOGETHER WITH FRIENDS OR RELATIVES?: PATIENT DECLINED

## 2024-11-20 ENCOUNTER — OFFICE VISIT (OUTPATIENT)
Dept: FAMILY MEDICINE | Facility: CLINIC | Age: 49
End: 2024-11-20
Payer: COMMERCIAL

## 2024-11-20 VITALS
BODY MASS INDEX: 34.94 KG/M2 | RESPIRATION RATE: 16 BRPM | WEIGHT: 281 LBS | SYSTOLIC BLOOD PRESSURE: 132 MMHG | DIASTOLIC BLOOD PRESSURE: 88 MMHG | HEIGHT: 75 IN | TEMPERATURE: 97.5 F | HEART RATE: 62 BPM | OXYGEN SATURATION: 98 %

## 2024-11-20 DIAGNOSIS — Z00.00 WELL ADULT EXAM: Primary | ICD-10-CM

## 2024-11-20 DIAGNOSIS — Z13.6 CARDIOVASCULAR SCREENING; LDL GOAL LESS THAN 160: ICD-10-CM

## 2024-11-20 DIAGNOSIS — R73.09 ELEVATED GLUCOSE: ICD-10-CM

## 2024-11-20 DIAGNOSIS — G47.33 OBSTRUCTIVE SLEEP APNEA: Chronic | ICD-10-CM

## 2024-11-20 DIAGNOSIS — E66.01 MORBID OBESITY (H): ICD-10-CM

## 2024-11-20 PROBLEM — E66.812 CLASS 2 SEVERE OBESITY DUE TO EXCESS CALORIES WITH SERIOUS COMORBIDITY IN ADULT (H): Status: RESOLVED | Noted: 2024-11-20 | Resolved: 2024-11-20

## 2024-11-20 PROBLEM — E66.812 CLASS 2 SEVERE OBESITY DUE TO EXCESS CALORIES WITH SERIOUS COMORBIDITY IN ADULT (H): Status: ACTIVE | Noted: 2024-11-20

## 2024-11-20 LAB
CHOLEST SERPL-MCNC: 189 MG/DL
EST. AVERAGE GLUCOSE BLD GHB EST-MCNC: 108 MG/DL
FASTING STATUS PATIENT QL REPORTED: NO
HBA1C MFR BLD: 5.4 % (ref 0–5.6)
HDLC SERPL-MCNC: 56 MG/DL
LDLC SERPL CALC-MCNC: 105 MG/DL
NONHDLC SERPL-MCNC: 133 MG/DL
TRIGL SERPL-MCNC: 141 MG/DL

## 2024-11-20 PROCEDURE — 36415 COLL VENOUS BLD VENIPUNCTURE: CPT | Performed by: FAMILY MEDICINE

## 2024-11-20 PROCEDURE — 80061 LIPID PANEL: CPT | Performed by: FAMILY MEDICINE

## 2024-11-20 PROCEDURE — 83036 HEMOGLOBIN GLYCOSYLATED A1C: CPT | Performed by: FAMILY MEDICINE

## 2024-11-20 ASSESSMENT — PAIN SCALES - GENERAL: PAINLEVEL_OUTOF10: NO PAIN (0)

## 2024-11-20 NOTE — PROGRESS NOTES
"Preventive Care Visit  [facility]  Mahamed Martin MD, Family Medicine  Nov 20, 2024      Assessment & Plan   Well exam  Morbid obesity  Sleep apnea, stable    Work on wt loss.  Continue apnea.     Back in a year.  Update labs.  Declines shots.          BMI  Estimated body mass index is 35.12 kg/m  as calculated from the following:    Height as of this encounter: 1.905 m (6' 3\").    Weight as of this encounter: 127.5 kg (281 lb).   Weight management plan: diet/exercise     Counseling  Appropriate preventive services were addressed with this patient via screening, questionnaire, or discussion as appropriate for fall prevention, nutrition, physical activity, Tobacco-use cessation, social engagement, weight loss and cognition.  Checklist reviewing preventive services available has been given to the patient.  Reviewed patient's diet, addressing concerns and/or questions.   He is at risk for lack of exercise and has been provided with information to increase physical activity for the benefit of his well-being.         Bryanna Schwarz is a 48 year old, presenting for the following:  Physical  Morbid obesity :has lost some weight.  Knows he needs to lose more.   Sleep apnea: cpap, works well for him        11/20/2024     5:08 PM   Additional Questions   Roomed by carolann   Accompanied by self            Health Care Directive  Patient does not have a Health Care Directive:       11/18/2024   General Health   How would you rate your overall physical health? Good   Feel stress (tense, anxious, or unable to sleep) Not at all            11/18/2024   Nutrition   Three or more servings of calcium each day? Yes   Diet: Regular (no restrictions)    Breakfast skipped   How many servings of fruit and vegetables per day? (!) 2-3   How many sweetened beverages each day? 0-1       Multiple values from one day are sorted in reverse-chronological order         11/18/2024   Exercise   Days per week of " "moderate/strenous exercise 2 days   Average minutes spent exercising at this level 20 min      (!) EXERCISE CONCERN      2024   Social Factors   Frequency of gathering with friends or relatives Patient declined   Worry food won't last until get money to buy more No   Food not last or not have enough money for food? No   Do you have housing? (Housing is defined as stable permanent housing and does not include staying ouside in a car, in a tent, in an abandoned building, in an overnight shelter, or couch-surfing.) Yes   Are you worried about losing your housing? No   Lack of transportation? No   Unable to get utilities (heat,electricity)? No            2024   Dental   Dentist two times every year? Yes            2024   TB Screening   Were you born outside of the [place]? No              2024   Substance Use   Alcohol more than 3/day or more than 7/wk No   Do you use any other substances recreationally? No        Social History     Tobacco Use    Smoking status: Former     Current packs/day: 0.00     Average packs/day: 0.5 packs/day for 20.0 years (10.0 ttl pk-yrs)     Types: Cigarettes     Start date: 1993     Quit date: 2013     Years since quittin.4    Smokeless tobacco: Never   Vaping Use    Vaping status: Never Used   Substance Use Topics    Alcohol use: Yes     Comment: rarely    Drug use: No           2024   One time HIV Screening   Previous HIV test? I don't know          2024   STI Screening   New sexual partner(s) since last STI/HIV test? No              2024   Contraception/Family Planning   Questions about contraception or family planning No       Reviewed and updated as needed this visit by Provider                         Objective    Exam  /88   Pulse 62   Temp 97.5  F (36.4  C) (Tympanic)   Resp 16   Ht 1.905 m (6' 3\")   Wt 127.5 kg (281 lb)   SpO2 98%   BMI 35.12 kg/m     Estimated body mass index is 34.37 kg/m  as calculated from the " "following:    Height as of 10/21/24: 1.905 m (6' 3\").    Weight as of 10/21/24: 124.7 kg (275 lb).    Physical Exam  Gen: alert and oriented, in no acute distress, affect within normal limits  Neck: supple with no masses or nodes  Throat: oropharynx clear, no exudate or tonsillar/palate asymmetry.    CV: RRR, no murmur  Lungs: clear bilaterally with good effort  Abd: nontender, no mass  Ext: no edema or lesions   Neuro: moving all extremities, gait normal, no focal deficts noted          Signed Electronically by: Mahamed Martin MD    "

## 2025-01-17 ENCOUNTER — VIRTUAL VISIT (OUTPATIENT)
Dept: SLEEP MEDICINE | Facility: CLINIC | Age: 50
End: 2025-01-17
Attending: PHYSICIAN ASSISTANT
Payer: COMMERCIAL

## 2025-01-17 DIAGNOSIS — R09.02 HYPOXEMIA: ICD-10-CM

## 2025-01-21 NOTE — PROGRESS NOTES
Device has been registered and shipped via [employer] on 01/17/25. Patient was notified that package was mailed out.

## 2025-03-03 ENCOUNTER — MYC MEDICAL ADVICE (OUTPATIENT)
Dept: FAMILY MEDICINE | Facility: CLINIC | Age: 50
End: 2025-03-03
Payer: COMMERCIAL

## (undated) DEVICE — ENDO SNARE EXACTO COLD 9MM LOOP 2.4MMX230CM 00711115